# Patient Record
Sex: FEMALE | Race: OTHER | HISPANIC OR LATINO | URBAN - METROPOLITAN AREA
[De-identification: names, ages, dates, MRNs, and addresses within clinical notes are randomized per-mention and may not be internally consistent; named-entity substitution may affect disease eponyms.]

---

## 2017-03-13 ENCOUNTER — INPATIENT (INPATIENT)
Facility: HOSPITAL | Age: 72
LOS: 7 days | Discharge: HOME HEALTH | DRG: 175 | End: 2017-03-21
Attending: INTERNAL MEDICINE | Admitting: INTERNAL MEDICINE
Payer: MEDICARE

## 2017-03-13 VITALS
HEART RATE: 96 BPM | OXYGEN SATURATION: 93 % | SYSTOLIC BLOOD PRESSURE: 105 MMHG | HEIGHT: 64 IN | WEIGHT: 160.06 LBS | DIASTOLIC BLOOD PRESSURE: 73 MMHG

## 2017-03-13 DIAGNOSIS — J45.909 UNSPECIFIED ASTHMA, UNCOMPLICATED: ICD-10-CM

## 2017-03-13 DIAGNOSIS — M19.90 UNSPECIFIED OSTEOARTHRITIS, UNSPECIFIED SITE: ICD-10-CM

## 2017-03-13 DIAGNOSIS — I26.99 OTHER PULMONARY EMBOLISM WITHOUT ACUTE COR PULMONALE: ICD-10-CM

## 2017-03-13 DIAGNOSIS — I95.9 HYPOTENSION, UNSPECIFIED: ICD-10-CM

## 2017-03-13 DIAGNOSIS — Z29.9 ENCOUNTER FOR PROPHYLACTIC MEASURES, UNSPECIFIED: ICD-10-CM

## 2017-03-13 DIAGNOSIS — I26.09 OTHER PULMONARY EMBOLISM WITH ACUTE COR PULMONALE: ICD-10-CM

## 2017-03-13 LAB
ALBUMIN SERPL ELPH-MCNC: 3.4 G/DL — LOW (ref 3.5–5)
ALP SERPL-CCNC: 87 U/L — SIGNIFICANT CHANGE UP (ref 40–120)
ALT FLD-CCNC: 28 U/L DA — SIGNIFICANT CHANGE UP (ref 10–60)
ANION GAP SERPL CALC-SCNC: 13 MMOL/L — SIGNIFICANT CHANGE UP (ref 5–17)
APTT BLD: 117.4 SEC — HIGH (ref 27.5–37.4)
APTT BLD: 91.4 SEC — HIGH (ref 27.5–37.4)
AST SERPL-CCNC: 21 U/L — SIGNIFICANT CHANGE UP (ref 10–40)
BASE EXCESS BLDV CALC-SCNC: 0.8 MMOL/L — SIGNIFICANT CHANGE UP (ref -2–2)
BASOPHILS # BLD AUTO: 0.2 K/UL — SIGNIFICANT CHANGE UP (ref 0–0.2)
BASOPHILS NFR BLD AUTO: 1.5 % — SIGNIFICANT CHANGE UP (ref 0–2)
BILIRUB SERPL-MCNC: 0.5 MG/DL — SIGNIFICANT CHANGE UP (ref 0.2–1.2)
BUN SERPL-MCNC: 14 MG/DL — SIGNIFICANT CHANGE UP (ref 7–18)
CALCIUM SERPL-MCNC: 8.5 MG/DL — SIGNIFICANT CHANGE UP (ref 8.4–10.5)
CHLORIDE SERPL-SCNC: 105 MMOL/L — SIGNIFICANT CHANGE UP (ref 96–108)
CK MB BLD-MCNC: <1.9 % — SIGNIFICANT CHANGE UP (ref 0–3.5)
CK MB CFR SERPL CALC: <1 NG/ML — SIGNIFICANT CHANGE UP (ref 0–3.6)
CK SERPL-CCNC: 52 U/L — SIGNIFICANT CHANGE UP (ref 21–215)
CO2 SERPL-SCNC: 22 MMOL/L — SIGNIFICANT CHANGE UP (ref 22–31)
CREAT SERPL-MCNC: 1.02 MG/DL — SIGNIFICANT CHANGE UP (ref 0.5–1.3)
D DIMER BLD IA.RAPID-MCNC: HIGH NG/ML DDU
EOSINOPHIL # BLD AUTO: 0.5 K/UL — SIGNIFICANT CHANGE UP (ref 0–0.5)
EOSINOPHIL NFR BLD AUTO: 4.3 % — SIGNIFICANT CHANGE UP (ref 0–6)
GLUCOSE SERPL-MCNC: 166 MG/DL — HIGH (ref 70–99)
HCO3 BLDV-SCNC: 25 MMOL/L — SIGNIFICANT CHANGE UP (ref 21–29)
HCT VFR BLD CALC: 32.6 % — LOW (ref 34.5–45)
HCT VFR BLD CALC: 35.3 % — SIGNIFICANT CHANGE UP (ref 34.5–45)
HCT VFR BLD CALC: 36.8 % — SIGNIFICANT CHANGE UP (ref 34.5–45)
HGB BLD-MCNC: 11 G/DL — LOW (ref 11.5–15.5)
HGB BLD-MCNC: 11.7 G/DL — SIGNIFICANT CHANGE UP (ref 11.5–15.5)
HGB BLD-MCNC: 12.1 G/DL — SIGNIFICANT CHANGE UP (ref 11.5–15.5)
HOROWITZ INDEX BLDV+IHG-RTO: 28 — SIGNIFICANT CHANGE UP
LACTATE SERPL-SCNC: 1.4 MMOL/L — SIGNIFICANT CHANGE UP (ref 0.7–2)
LACTATE SERPL-SCNC: 3.7 MMOL/L — HIGH (ref 0.7–2)
LYMPHOCYTES # BLD AUTO: 36.9 % — SIGNIFICANT CHANGE UP (ref 13–44)
LYMPHOCYTES # BLD AUTO: 4.4 K/UL — HIGH (ref 1–3.3)
MCHC RBC-ENTMCNC: 26.6 PG — LOW (ref 27–34)
MCHC RBC-ENTMCNC: 26.8 PG — LOW (ref 27–34)
MCHC RBC-ENTMCNC: 27.6 PG — SIGNIFICANT CHANGE UP (ref 27–34)
MCHC RBC-ENTMCNC: 32.9 GM/DL — SIGNIFICANT CHANGE UP (ref 32–36)
MCHC RBC-ENTMCNC: 33.1 GM/DL — SIGNIFICANT CHANGE UP (ref 32–36)
MCHC RBC-ENTMCNC: 33.7 GM/DL — SIGNIFICANT CHANGE UP (ref 32–36)
MCV RBC AUTO: 79.7 FL — LOW (ref 80–100)
MCV RBC AUTO: 80.7 FL — SIGNIFICANT CHANGE UP (ref 80–100)
MCV RBC AUTO: 83.3 FL — SIGNIFICANT CHANGE UP (ref 80–100)
MONOCYTES # BLD AUTO: 0.9 K/UL — SIGNIFICANT CHANGE UP (ref 0–0.9)
MONOCYTES NFR BLD AUTO: 7.5 % — SIGNIFICANT CHANGE UP (ref 2–14)
NEUTROPHILS # BLD AUTO: 5.9 K/UL — SIGNIFICANT CHANGE UP (ref 1.8–7.4)
NEUTROPHILS NFR BLD AUTO: 49.8 % — SIGNIFICANT CHANGE UP (ref 43–77)
NT-PROBNP SERPL-SCNC: 44 PG/ML — SIGNIFICANT CHANGE UP (ref 0–125)
OB PNL STL: NEGATIVE — SIGNIFICANT CHANGE UP
OB PNL STL: NEGATIVE — SIGNIFICANT CHANGE UP
PCO2 BLDV: 41 MMHG — SIGNIFICANT CHANGE UP (ref 35–50)
PH BLDV: 7.4 — SIGNIFICANT CHANGE UP (ref 7.35–7.45)
PLATELET # BLD AUTO: 224 K/UL — SIGNIFICANT CHANGE UP (ref 150–400)
PLATELET # BLD AUTO: 237 K/UL — SIGNIFICANT CHANGE UP (ref 150–400)
PLATELET # BLD AUTO: 260 K/UL — SIGNIFICANT CHANGE UP (ref 150–400)
PO2 BLDV: <44 MMHG — SIGNIFICANT CHANGE UP (ref 25–45)
POTASSIUM SERPL-MCNC: 3.2 MMOL/L — LOW (ref 3.5–5.3)
POTASSIUM SERPL-SCNC: 3.2 MMOL/L — LOW (ref 3.5–5.3)
PROT SERPL-MCNC: 7.6 G/DL — SIGNIFICANT CHANGE UP (ref 6–8.3)
RBC # BLD: 4.1 M/UL — SIGNIFICANT CHANGE UP (ref 3.8–5.2)
RBC # BLD: 4.24 M/UL — SIGNIFICANT CHANGE UP (ref 3.8–5.2)
RBC # BLD: 4.56 M/UL — SIGNIFICANT CHANGE UP (ref 3.8–5.2)
RBC # FLD: 12.6 % — SIGNIFICANT CHANGE UP (ref 10.3–14.5)
RBC # FLD: 12.6 % — SIGNIFICANT CHANGE UP (ref 10.3–14.5)
RBC # FLD: 12.8 % — SIGNIFICANT CHANGE UP (ref 10.3–14.5)
SAO2 % BLDV: 35 % — LOW (ref 67–88)
SODIUM SERPL-SCNC: 140 MMOL/L — SIGNIFICANT CHANGE UP (ref 135–145)
TROPONIN I SERPL-MCNC: 0.04 NG/ML — SIGNIFICANT CHANGE UP (ref 0–0.04)
WBC # BLD: 10.7 K/UL — HIGH (ref 3.8–10.5)
WBC # BLD: 11.9 K/UL — HIGH (ref 3.8–10.5)
WBC # BLD: 15 K/UL — HIGH (ref 3.8–10.5)
WBC # FLD AUTO: 10.7 K/UL — HIGH (ref 3.8–10.5)
WBC # FLD AUTO: 11.9 K/UL — HIGH (ref 3.8–10.5)
WBC # FLD AUTO: 15 K/UL — HIGH (ref 3.8–10.5)

## 2017-03-13 PROCEDURE — 71010: CPT | Mod: 26

## 2017-03-13 PROCEDURE — 99285 EMERGENCY DEPT VISIT HI MDM: CPT

## 2017-03-13 PROCEDURE — G0452: CPT | Mod: 26

## 2017-03-13 PROCEDURE — 74174 CTA ABD&PLVS W/CONTRAST: CPT | Mod: 26

## 2017-03-13 PROCEDURE — 71275 CT ANGIOGRAPHY CHEST: CPT | Mod: 26

## 2017-03-13 PROCEDURE — 93970 EXTREMITY STUDY: CPT | Mod: 26

## 2017-03-13 RX ORDER — HEPARIN SODIUM 5000 [USP'U]/ML
INJECTION INTRAVENOUS; SUBCUTANEOUS
Qty: 25000 | Refills: 0 | Status: DISCONTINUED | OUTPATIENT
Start: 2017-03-13 | End: 2017-03-14

## 2017-03-13 RX ORDER — PANTOPRAZOLE SODIUM 20 MG/1
40 TABLET, DELAYED RELEASE ORAL
Qty: 0 | Refills: 0 | Status: DISCONTINUED | OUTPATIENT
Start: 2017-03-13 | End: 2017-03-21

## 2017-03-13 RX ORDER — HEPARIN SODIUM 5000 [USP'U]/ML
3000 INJECTION INTRAVENOUS; SUBCUTANEOUS EVERY 6 HOURS
Qty: 0 | Refills: 0 | Status: DISCONTINUED | OUTPATIENT
Start: 2017-03-13 | End: 2017-03-13

## 2017-03-13 RX ORDER — HEPARIN SODIUM 5000 [USP'U]/ML
6000 INJECTION INTRAVENOUS; SUBCUTANEOUS EVERY 6 HOURS
Qty: 0 | Refills: 0 | Status: DISCONTINUED | OUTPATIENT
Start: 2017-03-13 | End: 2017-03-13

## 2017-03-13 RX ORDER — SODIUM CHLORIDE 9 MG/ML
1000 INJECTION INTRAMUSCULAR; INTRAVENOUS; SUBCUTANEOUS
Qty: 0 | Refills: 0 | Status: DISCONTINUED | OUTPATIENT
Start: 2017-03-13 | End: 2017-03-14

## 2017-03-13 RX ORDER — ONDANSETRON 8 MG/1
4 TABLET, FILM COATED ORAL EVERY 8 HOURS
Qty: 0 | Refills: 0 | Status: DISCONTINUED | OUTPATIENT
Start: 2017-03-13 | End: 2017-03-21

## 2017-03-13 RX ORDER — SODIUM CHLORIDE 9 MG/ML
2000 INJECTION INTRAMUSCULAR; INTRAVENOUS; SUBCUTANEOUS ONCE
Qty: 0 | Refills: 0 | Status: COMPLETED | OUTPATIENT
Start: 2017-03-13 | End: 2017-03-13

## 2017-03-13 RX ORDER — HEPARIN SODIUM 5000 [USP'U]/ML
6000 INJECTION INTRAVENOUS; SUBCUTANEOUS ONCE
Qty: 0 | Refills: 0 | Status: COMPLETED | OUTPATIENT
Start: 2017-03-13 | End: 2017-03-13

## 2017-03-13 RX ADMIN — SODIUM CHLORIDE 100 MILLILITER(S): 9 INJECTION INTRAMUSCULAR; INTRAVENOUS; SUBCUTANEOUS at 12:34

## 2017-03-13 RX ADMIN — ONDANSETRON 4 MILLIGRAM(S): 8 TABLET, FILM COATED ORAL at 12:34

## 2017-03-13 RX ADMIN — HEPARIN SODIUM 6000 UNIT(S): 5000 INJECTION INTRAVENOUS; SUBCUTANEOUS at 10:59

## 2017-03-13 RX ADMIN — HEPARIN SODIUM 1300 UNIT(S)/HR: 5000 INJECTION INTRAVENOUS; SUBCUTANEOUS at 17:00

## 2017-03-13 RX ADMIN — HEPARIN SODIUM 1300 UNIT(S)/HR: 5000 INJECTION INTRAVENOUS; SUBCUTANEOUS at 10:54

## 2017-03-13 RX ADMIN — SODIUM CHLORIDE 4000 MILLILITER(S): 9 INJECTION INTRAMUSCULAR; INTRAVENOUS; SUBCUTANEOUS at 08:38

## 2017-03-13 NOTE — ED ADULT NURSE NOTE - OBJECTIVE STATEMENT
Patient received as a notification for unresponsiveness. Patient verbally responsive noted lethargic as per patient she walked a flight of stairs and felt dizzy, patient complaining of LLL pain, walked to [facility] and fell on the lobby. Patient was seen and evaluated by MD Hansen, pending tests no distress on cardiac monitor

## 2017-03-13 NOTE — ED PROVIDER NOTE - ENMT, MLM
Airway patent. Mucous membranes are dry. Throat has no vesicles, no oropharyngeal exudates and uvula is midline.

## 2017-03-13 NOTE — ED PROVIDER NOTE - CRITICAL CARE PROVIDED
documentation/additional history taking/consultation with other physicians/direct patient care (not related to procedure)/consult w/ pt's family directly relating to pts condition/interpretation of diagnostic studies

## 2017-03-13 NOTE — H&P ADULT. - PROBLEM SELECTOR PLAN 1
Secondary to obstructive shock 2/2 large clot burden.   -VS: Tmax 98.7, BP 97/57, HR 87, RR 18 and 96% on 2L NC.   -Orthostatics negative: lying /83, HR 88 and sitting /79, HR 85  -EKG shows sinus rhythm at 94bpm, no ST/T changes and CE x 1 negative  -CXR no infiltrate or effusion.   -D-dimer 23K and CTA chest showed bilateral large PE.  -In the ED: s/p NS 2L bolus without much improvement   -Admit to ICU   -Cardiology Dr. Pittman  -c/w Heparin gtt for now  -Stat Echo being obtained to r/o RV strain and role for tPA  -c/w NPO, IVF hydration and Zofran for prn nausea   -f/u repeat lactate   -f/u Venous US LE (re: r/o DVT)  -f/u hypercoagulation and autoimmune panel Secondary to obstructive shock 2/2 large clot burden.   -VS: Tmax 98.7, BP 97/57, HR 87, RR 18 and 96% on 2L NC.   -Orthostatics negative: lying /83, HR 88 and sitting /79, HR 85  -EKG shows sinus rhythm at 94bpm, no ST/T changes and CE x 1 negative  -CXR no infiltrate or effusion.   -CTA chest: extensive bilateral pulmonary emboli, bilaterally extending from the main pulmonary arteries to segmental and subsegmental pulmonary arteries in the upper and lower lobes, right larger than left. The main pulmonary trunk is enlarged, measuring 3.6 cm in diameter, suggestive of pulmonary arterial hypertension. There is evidence of right heart strain.  -In the ED: s/p NS 2L bolus without much improvement   -Admit to ICU   -Cardiology Dr. Pittman  -c/w Heparin gtt for now  -STAT Echo is being obtained to r/o RV strain and role for tPA  -c/w NPO, IVF hydration and Zofran for prn nausea   -f/u repeat lactate   -f/u Venous US LE (re: r/o DVT)  -f/u hypercoagulation and autoimmune panel

## 2017-03-13 NOTE — H&P ADULT. - HISTORY OF PRESENT ILLNESS
71 years old female from home with PMH of HTN (on Lisinopril for years), OA, Asthma (with nasal polyp, on Singulair) and multiple spontaneous abortions (no kids, unable to conceive) presented to the ED with dizziness and left leg pain. Patient was in her usual state health until yesterday when she noticed severe left leg pain without any trauma. She took Tylenol for pain with minimal relief. This morning 71 years old female from home with PMH of HTN (on Lisinopril for years), OA, Asthma (with nasal polyp, on Singulair) and multiple spontaneous abortions (no kids, unable to conceive) presented to the ED with dizziness and left leg pain. Patient was in her usual state health until yesterday when she noticed severe left leg pain without any trauma. She took Tylenol for pain with minimal relief. This morning while she was getting ready for work, she started to have dizziness with not much room spinning sensation. She reports feeling weak and when she came to [facility], she had near syncopal episode with ?frothing at the mouth. Patient reports no fall and no head trauma. No LOC, tongue bite, fecal or urinary incontinence. Pertinent history; she was recently treated with Medrol pack (4mg with 6 days taper starting 24mg day 1) for Asthma and allergy. She completed the taper 3-4 months ago. She recently flu to [employer] (5 hour flight) last month, in February. She has history of joint pain, miscarriages and spontaneous abortions; still unable to conceive. Denies malar rash or oral ulcers. Family 71 years old female from home with PMH of HTN (on Lisinopril for years), OA, Asthma (with nasal polyp, on Singulair) and multiple spontaneous abortions (no kids, unable to conceive) presented to the ED with dizziness and left leg pain. Patient was in her usual state health until yesterday when she noticed severe left leg pain without any trauma. She took Tylenol for pain with minimal relief. This morning while she was getting ready for work, she started to have dizziness with not much room spinning sensation. She reports feeling weak and when she came to [facility], she had near syncopal episode with ?frothing at the mouth. Patient reports no fall and no head trauma. No LOC, tongue bite, fecal or urinary incontinence. Pertinent history; she was recently treated with Medrol pack (4mg with 6 days taper starting 24mg day 1) for Asthma and allergy. She completed the taper 3-4 months ago. She recently flu to [employer] (5 hour flight) last month, in February. She has history of joint pain, miscarriages and spontaneous abortions; still unable to conceive. Denies malar rash or oral ulcers. Family history of sister who had ?clot in the arm and brother with MI (age 58). Patient denies any chest pain, palpitations, shortness of breath but does report diaphoresis. Reports nausea but denies fever, chills or abdominal pain. Denies smoking or alcohol use.     In the ED, VS: Tmax 98.7, BP 97/57, HR 87, RR 18 and 96% on 2L NC. EKG shows sinus rhythm at 94bpm, no ST/T changes. CXR no infiltrate or effusion. CTA chest showed bilateral large PE. Patient was given NS 2L bolus without much improvement in the BP therefore ICU was consulted.

## 2017-03-13 NOTE — H&P ADULT. - ATTENDING COMMENTS
Assessment and Plan:   Assessment:  · Assessment	  71 years old female from home with PMH of HTN (on Lisinopril for years), OA, Asthma (with nasal polyp, on Singulair) and multiple spontaneous abortions (no kids, unable to conceive) presented to the ED with dizziness and left leg pain. Patient is being admitted to ICU for hypotension in the setting of large bilateral PE.       Problem/Plan - 1:  ·  Problem: Hypotension.  Plan: Secondary to obstructive shock 2/2 large clot burden.   -VS: Tmax 98.7, BP 97/57, HR 87, RR 18 and 96% on 2L NC.   -Orthostatics negative: lying /83, HR 88 and sitting /79, HR 85  -EKG shows sinus rhythm at 94bpm, no ST/T changes and CE x 1 negative  -CXR no infiltrate or effusion.   -CTA chest: extensive bilateral pulmonary emboli, bilaterally extending from the main pulmonary arteries to segmental and subsegmental pulmonary arteries in the upper and lower lobes, right larger than left. The main pulmonary trunk is enlarged, measuring 3.6 cm in diameter, suggestive of pulmonary arterial hypertension. There is evidence of right heart strain. 2DECHO did not show RA or RV failure and pat has negative Troponin , therefore treated with heparin drip since nitals are relatively stable  -In the ED: s/p NS 2L bolus without much improvement   -Admit to ICU   -Cardiology Dr. Pittman  -c/w Heparin gtt for now  -STAT Echo is being obtained to r/o RV strain and role for tPA  -c/w NPO, IVF hydration and Zofran for prn nausea   -f/u repeat lactate   -f/u Venous US LE (re: r/o DVT)  -f/u hypercoagulation and autoimmune panel.     Problem/Plan - 2:  ·  Problem: Pulmonary embolism, bilateral.  Plan: Likely 2/2 DVT (unprovoked) 2/2 hypercoagulable state family history of clots ( ?Factor V Leiden) vs APL (h/o multiple spontaneous abortions/miscarriages).  -Management as mentioned above  -Work-up as mentioned above.     Problem/Plan - 3:  ·  Problem: Asthma.  Plan: Not in exacerbation, with nasal polyp. C/w NC for now.     Problem/Plan - 4:  ·  Problem: Arthritis.  Plan: Likely 2/2 autoimmune arthritis. C/w current management.     Problem/Plan - 5:  ·  Problem: Prophylactic measure.  Plan: GI on Protonix and DVT patient has PE and on Heparin gtt.

## 2017-03-13 NOTE — H&P ADULT. - ASSESSMENT
71 years old female from home with PMH of HTN (on Lisinopril for years), OA, Asthma (with nasal polyp, on Singulair) and multiple spontaneous abortions (no kids, unable to conceive) presented to the ED with dizziness and left leg pain. Patient is being admitted to ICU for hypotension in the setting of large bilateral PE.

## 2017-03-13 NOTE — ED PROVIDER NOTE - MEDICAL DECISION MAKING DETAILS
70 y/o F pt BIB EMS c/o lightheadedness s/p syncopal episode today (this morning). Blood work, fluid resuscitation, X-Ray, CTA, and ICU consult.

## 2017-03-13 NOTE — ED PROVIDER NOTE - NS ED MD SCRIBE ATTENDING SCRIBE SECTIONS
PAST MEDICAL/SURGICAL/SOCIAL HISTORY/DISPOSITION/HISTORY OF PRESENT ILLNESS/RESULTS/PHYSICAL EXAM/REVIEW OF SYSTEMS/VITAL SIGNS( Pullset)

## 2017-03-13 NOTE — ED PROVIDER NOTE - OBJECTIVE STATEMENT
72 y/o F pt with PMHx of HTN (currently on Enalapril 20mg) and Seasonal Allergies and no PSHx BIB EMS to ED c/o lightheadedness s/p syncopal episode today (this morning). Pt states she felt dizziness after walking up one flight of stairs at work; pt then walked to [facility], where she fainted in the lobby. Pt also states pain in L leg. Pt denies HA, cough, CP, fever, chills, nausea (feels nauseous now in ED), vomiting, black stool, blood in stool, or any other complaints. Pt also denies Hx of cardiac problems, Hx of smoking, or Hx of EtOH abuse. Pt did not take Enalapril today; pt took Tylenol this morning for L leg pain. FS in ED: 150. NKDA.

## 2017-03-13 NOTE — ED PROVIDER NOTE - CHPI ED SYMPTOMS NEG
no fever/no nausea/no vomiting/no chills, no HA, no cough, no chest pain, no black stool, no blood in stool

## 2017-03-13 NOTE — ED PROVIDER NOTE - CARE PLAN
Principal Discharge DX:	Hypotension Principal Discharge DX:	Other acute pulmonary embolism with acute cor pulmonale

## 2017-03-13 NOTE — H&P ADULT. - PROBLEM SELECTOR PLAN 2
Likely 2/2 DVT (unprovoked) 2/2 hypercoagulable state family history of clots ( ?Factor V Leiden) vs APL (h/o multiple spontaneous abortions/miscarriages).  -Management as mentioned above  -Work-up as mentioned above

## 2017-03-14 LAB
ANA PAT FLD IF-IMP: ABNORMAL
ANA TITR SER: ABNORMAL
ANION GAP SERPL CALC-SCNC: 11 MMOL/L — SIGNIFICANT CHANGE UP (ref 5–17)
ANTI-RIBONUCLEAR PROTEIN: 0.2 AI — SIGNIFICANT CHANGE UP
APCR PPP: 2.41 RATIO — SIGNIFICANT CHANGE UP
APTT BLD: 85.6 SEC — HIGH (ref 27.5–37.4)
APTT BLD: 90.1 SEC — HIGH (ref 27.5–37.4)
AT III ACT/NOR PPP CHRO: 78 % — LOW (ref 85–135)
B2 GLYCOPROT1 AB SER QL: POSITIVE
BASOPHILS # BLD AUTO: 0.1 K/UL — SIGNIFICANT CHANGE UP (ref 0–0.2)
BASOPHILS NFR BLD AUTO: 0.3 % — SIGNIFICANT CHANGE UP (ref 0–2)
BUN SERPL-MCNC: 8 MG/DL — SIGNIFICANT CHANGE UP (ref 7–18)
C3 SERPL-MCNC: 110 MG/DL — SIGNIFICANT CHANGE UP (ref 80–180)
C4 SERPL-MCNC: 13 MG/DL — SIGNIFICANT CHANGE UP (ref 10–45)
CALCIUM SERPL-MCNC: 8.2 MG/DL — LOW (ref 8.4–10.5)
CARDIOLIPIN AB SER-ACNC: NEGATIVE — SIGNIFICANT CHANGE UP
CHLORIDE SERPL-SCNC: 112 MMOL/L — HIGH (ref 96–108)
CK MB BLD-MCNC: 4 % — HIGH (ref 0–3.5)
CK MB CFR SERPL CALC: 3.4 NG/ML — SIGNIFICANT CHANGE UP (ref 0–3.6)
CK SERPL-CCNC: 84 U/L — SIGNIFICANT CHANGE UP (ref 21–215)
CO2 SERPL-SCNC: 20 MMOL/L — LOW (ref 22–31)
CREAT SERPL-MCNC: 0.65 MG/DL — SIGNIFICANT CHANGE UP (ref 0.5–1.3)
DRVVT SCREEN TO CONFIRM RATIO: SIGNIFICANT CHANGE UP
DSDNA AB SER-ACNC: <12 IU/ML — SIGNIFICANT CHANGE UP
ENA SM AB FLD QL: <0.2 AI — SIGNIFICANT CHANGE UP
EOSINOPHIL # BLD AUTO: 0 K/UL — SIGNIFICANT CHANGE UP (ref 0–0.5)
EOSINOPHIL NFR BLD AUTO: 0.1 % — SIGNIFICANT CHANGE UP (ref 0–6)
FACT V ACT/NOR PPP: 67 % — SIGNIFICANT CHANGE UP (ref 50–150)
GLUCOSE SERPL-MCNC: 122 MG/DL — HIGH (ref 70–99)
HCT VFR BLD CALC: 33 % — LOW (ref 34.5–45)
HCYS SERPL-MCNC: 9 UMOL/L — SIGNIFICANT CHANGE UP (ref 5–20)
HGB BLD-MCNC: 10.9 G/DL — LOW (ref 11.5–15.5)
LA NT DPL PPP QL: 39.7 SEC — SIGNIFICANT CHANGE UP
LYMPHOCYTES # BLD AUTO: 1.7 K/UL — SIGNIFICANT CHANGE UP (ref 1–3.3)
LYMPHOCYTES # BLD AUTO: 9.7 % — LOW (ref 13–44)
MAGNESIUM SERPL-MCNC: 1.8 MG/DL — SIGNIFICANT CHANGE UP (ref 1.8–2.4)
MCHC RBC-ENTMCNC: 26.5 PG — LOW (ref 27–34)
MCHC RBC-ENTMCNC: 33 GM/DL — SIGNIFICANT CHANGE UP (ref 32–36)
MCV RBC AUTO: 80.4 FL — SIGNIFICANT CHANGE UP (ref 80–100)
MONOCYTES # BLD AUTO: 1 K/UL — HIGH (ref 0–0.9)
MONOCYTES NFR BLD AUTO: 5.9 % — SIGNIFICANT CHANGE UP (ref 2–14)
NEUTROPHILS # BLD AUTO: 14.6 K/UL — HIGH (ref 1.8–7.4)
NEUTROPHILS NFR BLD AUTO: 84 % — HIGH (ref 43–77)
PHOSPHATE SERPL-MCNC: 2.7 MG/DL — SIGNIFICANT CHANGE UP (ref 2.5–4.5)
PLATELET # BLD AUTO: 233 K/UL — SIGNIFICANT CHANGE UP (ref 150–400)
POTASSIUM SERPL-MCNC: 3.5 MMOL/L — SIGNIFICANT CHANGE UP (ref 3.5–5.3)
POTASSIUM SERPL-SCNC: 3.5 MMOL/L — SIGNIFICANT CHANGE UP (ref 3.5–5.3)
PROT C ACT/NOR PPP: 81 % — SIGNIFICANT CHANGE UP (ref 74–150)
PROT S FREE AG PPP IA-ACNC: 55 % — LOW (ref 61–131)
RBC # BLD: 4.1 M/UL — SIGNIFICANT CHANGE UP (ref 3.8–5.2)
RBC # FLD: 12.6 % — SIGNIFICANT CHANGE UP (ref 10.3–14.5)
SODIUM SERPL-SCNC: 143 MMOL/L — SIGNIFICANT CHANGE UP (ref 135–145)
TROPONIN I SERPL-MCNC: 1.54 NG/ML — HIGH (ref 0–0.04)
WBC # BLD: 17.3 K/UL — HIGH (ref 3.8–10.5)
WBC # FLD AUTO: 17.3 K/UL — HIGH (ref 3.8–10.5)

## 2017-03-14 PROCEDURE — 71010: CPT | Mod: 26

## 2017-03-14 RX ORDER — TIOTROPIUM BROMIDE 18 UG/1
1 CAPSULE ORAL; RESPIRATORY (INHALATION) DAILY
Qty: 0 | Refills: 0 | Status: DISCONTINUED | OUTPATIENT
Start: 2017-03-14 | End: 2017-03-21

## 2017-03-14 RX ORDER — HEPARIN SODIUM 5000 [USP'U]/ML
1100 INJECTION INTRAVENOUS; SUBCUTANEOUS
Qty: 25000 | Refills: 0 | Status: DISCONTINUED | OUTPATIENT
Start: 2017-03-14 | End: 2017-03-14

## 2017-03-14 RX ORDER — HEPARIN SODIUM 5000 [USP'U]/ML
3000 INJECTION INTRAVENOUS; SUBCUTANEOUS EVERY 6 HOURS
Qty: 0 | Refills: 0 | Status: DISCONTINUED | OUTPATIENT
Start: 2017-03-14 | End: 2017-03-14

## 2017-03-14 RX ORDER — IPRATROPIUM/ALBUTEROL SULFATE 18-103MCG
3 AEROSOL WITH ADAPTER (GRAM) INHALATION EVERY 6 HOURS
Qty: 0 | Refills: 0 | Status: DISCONTINUED | OUTPATIENT
Start: 2017-03-14 | End: 2017-03-17

## 2017-03-14 RX ORDER — ALBUTEROL 90 UG/1
1 AEROSOL, METERED ORAL EVERY 4 HOURS
Qty: 0 | Refills: 0 | Status: DISCONTINUED | OUTPATIENT
Start: 2017-03-14 | End: 2017-03-21

## 2017-03-14 RX ORDER — HEPARIN SODIUM 5000 [USP'U]/ML
6000 INJECTION INTRAVENOUS; SUBCUTANEOUS EVERY 6 HOURS
Qty: 0 | Refills: 0 | Status: DISCONTINUED | OUTPATIENT
Start: 2017-03-14 | End: 2017-03-14

## 2017-03-14 RX ORDER — MONTELUKAST 4 MG/1
10 TABLET, CHEWABLE ORAL AT BEDTIME
Qty: 0 | Refills: 0 | Status: DISCONTINUED | OUTPATIENT
Start: 2017-03-14 | End: 2017-03-21

## 2017-03-14 RX ORDER — ENOXAPARIN SODIUM 100 MG/ML
80 INJECTION SUBCUTANEOUS EVERY 12 HOURS
Qty: 0 | Refills: 0 | Status: DISCONTINUED | OUTPATIENT
Start: 2017-03-14 | End: 2017-03-21

## 2017-03-14 RX ADMIN — HEPARIN SODIUM 1100 UNIT(S)/HR: 5000 INJECTION INTRAVENOUS; SUBCUTANEOUS at 00:14

## 2017-03-14 RX ADMIN — HEPARIN SODIUM 1100 UNIT(S)/HR: 5000 INJECTION INTRAVENOUS; SUBCUTANEOUS at 07:05

## 2017-03-14 RX ADMIN — Medication 3 MILLILITER(S): at 21:05

## 2017-03-14 RX ADMIN — MONTELUKAST 10 MILLIGRAM(S): 4 TABLET, CHEWABLE ORAL at 22:18

## 2017-03-14 RX ADMIN — SODIUM CHLORIDE 100 MILLILITER(S): 9 INJECTION INTRAMUSCULAR; INTRAVENOUS; SUBCUTANEOUS at 07:11

## 2017-03-14 RX ADMIN — Medication 3 MILLILITER(S): at 14:28

## 2017-03-14 RX ADMIN — Medication 3 MILLILITER(S): at 08:47

## 2017-03-14 RX ADMIN — PANTOPRAZOLE SODIUM 40 MILLIGRAM(S): 20 TABLET, DELAYED RELEASE ORAL at 06:16

## 2017-03-14 RX ADMIN — HEPARIN SODIUM 1100 UNIT(S)/HR: 5000 INJECTION INTRAVENOUS; SUBCUTANEOUS at 13:48

## 2017-03-14 RX ADMIN — ENOXAPARIN SODIUM 80 MILLIGRAM(S): 100 INJECTION SUBCUTANEOUS at 18:08

## 2017-03-15 LAB
ANION GAP SERPL CALC-SCNC: 12 MMOL/L — SIGNIFICANT CHANGE UP (ref 5–17)
BASOPHILS # BLD AUTO: 0.1 K/UL — SIGNIFICANT CHANGE UP (ref 0–0.2)
BASOPHILS NFR BLD AUTO: 1 % — SIGNIFICANT CHANGE UP (ref 0–2)
BUN SERPL-MCNC: 8 MG/DL — SIGNIFICANT CHANGE UP (ref 7–18)
CALCIUM SERPL-MCNC: 8.1 MG/DL — LOW (ref 8.4–10.5)
CHLORIDE SERPL-SCNC: 111 MMOL/L — HIGH (ref 96–108)
CO2 SERPL-SCNC: 22 MMOL/L — SIGNIFICANT CHANGE UP (ref 22–31)
CREAT SERPL-MCNC: 0.64 MG/DL — SIGNIFICANT CHANGE UP (ref 0.5–1.3)
EOSINOPHIL # BLD AUTO: 0.1 K/UL — SIGNIFICANT CHANGE UP (ref 0–0.5)
EOSINOPHIL NFR BLD AUTO: 0.8 % — SIGNIFICANT CHANGE UP (ref 0–6)
GLUCOSE SERPL-MCNC: 100 MG/DL — HIGH (ref 70–99)
HCT VFR BLD CALC: 30.7 % — LOW (ref 34.5–45)
HGB BLD-MCNC: 10 G/DL — LOW (ref 11.5–15.5)
LYMPHOCYTES # BLD AUTO: 2.4 K/UL — SIGNIFICANT CHANGE UP (ref 1–3.3)
LYMPHOCYTES # BLD AUTO: 20.4 % — SIGNIFICANT CHANGE UP (ref 13–44)
MAGNESIUM SERPL-MCNC: 2 MG/DL — SIGNIFICANT CHANGE UP (ref 1.8–2.4)
MCHC RBC-ENTMCNC: 27.2 PG — SIGNIFICANT CHANGE UP (ref 27–34)
MCHC RBC-ENTMCNC: 32.5 GM/DL — SIGNIFICANT CHANGE UP (ref 32–36)
MCV RBC AUTO: 83.8 FL — SIGNIFICANT CHANGE UP (ref 80–100)
MONOCYTES # BLD AUTO: 0.8 K/UL — SIGNIFICANT CHANGE UP (ref 0–0.9)
MONOCYTES NFR BLD AUTO: 7.1 % — SIGNIFICANT CHANGE UP (ref 2–14)
NEUTROPHILS # BLD AUTO: 8.2 K/UL — HIGH (ref 1.8–7.4)
NEUTROPHILS NFR BLD AUTO: 70.8 % — SIGNIFICANT CHANGE UP (ref 43–77)
PHOSPHATE SERPL-MCNC: 2.2 MG/DL — LOW (ref 2.5–4.5)
PLATELET # BLD AUTO: 230 K/UL — SIGNIFICANT CHANGE UP (ref 150–400)
POTASSIUM SERPL-MCNC: 3.6 MMOL/L — SIGNIFICANT CHANGE UP (ref 3.5–5.3)
POTASSIUM SERPL-SCNC: 3.6 MMOL/L — SIGNIFICANT CHANGE UP (ref 3.5–5.3)
RBC # BLD: 3.67 M/UL — LOW (ref 3.8–5.2)
RBC # FLD: 12.7 % — SIGNIFICANT CHANGE UP (ref 10.3–14.5)
SODIUM SERPL-SCNC: 145 MMOL/L — SIGNIFICANT CHANGE UP (ref 135–145)
WBC # BLD: 11.6 K/UL — HIGH (ref 3.8–10.5)
WBC # FLD AUTO: 11.6 K/UL — HIGH (ref 3.8–10.5)

## 2017-03-15 PROCEDURE — 71010: CPT | Mod: 26

## 2017-03-15 RX ORDER — POTASSIUM PHOSPHATE, MONOBASIC POTASSIUM PHOSPHATE, DIBASIC 236; 224 MG/ML; MG/ML
15 INJECTION, SOLUTION INTRAVENOUS ONCE
Qty: 0 | Refills: 0 | Status: COMPLETED | OUTPATIENT
Start: 2017-03-15 | End: 2017-03-15

## 2017-03-15 RX ADMIN — PANTOPRAZOLE SODIUM 40 MILLIGRAM(S): 20 TABLET, DELAYED RELEASE ORAL at 06:51

## 2017-03-15 RX ADMIN — Medication 3 MILLILITER(S): at 09:29

## 2017-03-15 RX ADMIN — ENOXAPARIN SODIUM 80 MILLIGRAM(S): 100 INJECTION SUBCUTANEOUS at 17:33

## 2017-03-15 RX ADMIN — POTASSIUM PHOSPHATE, MONOBASIC POTASSIUM PHOSPHATE, DIBASIC 62.5 MILLIMOLE(S): 236; 224 INJECTION, SOLUTION INTRAVENOUS at 09:29

## 2017-03-15 RX ADMIN — Medication 3 MILLILITER(S): at 15:35

## 2017-03-15 RX ADMIN — MONTELUKAST 10 MILLIGRAM(S): 4 TABLET, CHEWABLE ORAL at 21:51

## 2017-03-15 RX ADMIN — ENOXAPARIN SODIUM 80 MILLIGRAM(S): 100 INJECTION SUBCUTANEOUS at 05:08

## 2017-03-16 LAB
ANION GAP SERPL CALC-SCNC: 9 MMOL/L — SIGNIFICANT CHANGE UP (ref 5–17)
BASOPHILS # BLD AUTO: 0.1 K/UL — SIGNIFICANT CHANGE UP (ref 0–0.2)
BASOPHILS NFR BLD AUTO: 1.2 % — SIGNIFICANT CHANGE UP (ref 0–2)
BUN SERPL-MCNC: 9 MG/DL — SIGNIFICANT CHANGE UP (ref 7–18)
CALCIUM SERPL-MCNC: 8.3 MG/DL — LOW (ref 8.4–10.5)
CHLORIDE SERPL-SCNC: 109 MMOL/L — HIGH (ref 96–108)
CO2 SERPL-SCNC: 24 MMOL/L — SIGNIFICANT CHANGE UP (ref 22–31)
CREAT SERPL-MCNC: 0.66 MG/DL — SIGNIFICANT CHANGE UP (ref 0.5–1.3)
DNA PLOIDY SPEC FC-IMP: SIGNIFICANT CHANGE UP
EOSINOPHIL # BLD AUTO: 0.4 K/UL — SIGNIFICANT CHANGE UP (ref 0–0.5)
EOSINOPHIL NFR BLD AUTO: 4 % — SIGNIFICANT CHANGE UP (ref 0–6)
GLUCOSE SERPL-MCNC: 94 MG/DL — SIGNIFICANT CHANGE UP (ref 70–99)
HCT VFR BLD CALC: 30.6 % — LOW (ref 34.5–45)
HGB BLD-MCNC: 10.1 G/DL — LOW (ref 11.5–15.5)
LYMPHOCYTES # BLD AUTO: 2.3 K/UL — SIGNIFICANT CHANGE UP (ref 1–3.3)
LYMPHOCYTES # BLD AUTO: 22.9 % — SIGNIFICANT CHANGE UP (ref 13–44)
MAGNESIUM SERPL-MCNC: 2.1 MG/DL — SIGNIFICANT CHANGE UP (ref 1.8–2.4)
MCHC RBC-ENTMCNC: 26.5 PG — LOW (ref 27–34)
MCHC RBC-ENTMCNC: 33.1 GM/DL — SIGNIFICANT CHANGE UP (ref 32–36)
MCV RBC AUTO: 80 FL — SIGNIFICANT CHANGE UP (ref 80–100)
MONOCYTES # BLD AUTO: 0.7 K/UL — SIGNIFICANT CHANGE UP (ref 0–0.9)
MONOCYTES NFR BLD AUTO: 6.8 % — SIGNIFICANT CHANGE UP (ref 2–14)
MTHFR GENE INTERPRETATION: SIGNIFICANT CHANGE UP
NEUTROPHILS # BLD AUTO: 6.5 K/UL — SIGNIFICANT CHANGE UP (ref 1.8–7.4)
NEUTROPHILS NFR BLD AUTO: 65.1 % — SIGNIFICANT CHANGE UP (ref 43–77)
PHOSPHATE SERPL-MCNC: 3.3 MG/DL — SIGNIFICANT CHANGE UP (ref 2.5–4.5)
PLATELET # BLD AUTO: 270 K/UL — SIGNIFICANT CHANGE UP (ref 150–400)
POTASSIUM SERPL-MCNC: 3.5 MMOL/L — SIGNIFICANT CHANGE UP (ref 3.5–5.3)
POTASSIUM SERPL-SCNC: 3.5 MMOL/L — SIGNIFICANT CHANGE UP (ref 3.5–5.3)
PTR INTERPRETATION: SIGNIFICANT CHANGE UP
RBC # BLD: 3.83 M/UL — SIGNIFICANT CHANGE UP (ref 3.8–5.2)
RBC # FLD: 12.4 % — SIGNIFICANT CHANGE UP (ref 10.3–14.5)
SODIUM SERPL-SCNC: 142 MMOL/L — SIGNIFICANT CHANGE UP (ref 135–145)
WBC # BLD: 10 K/UL — SIGNIFICANT CHANGE UP (ref 3.8–10.5)
WBC # FLD AUTO: 10 K/UL — SIGNIFICANT CHANGE UP (ref 3.8–10.5)

## 2017-03-16 RX ORDER — WARFARIN SODIUM 2.5 MG/1
5 TABLET ORAL DAILY
Qty: 0 | Refills: 0 | Status: DISCONTINUED | OUTPATIENT
Start: 2017-03-16 | End: 2017-03-17

## 2017-03-16 RX ADMIN — MONTELUKAST 10 MILLIGRAM(S): 4 TABLET, CHEWABLE ORAL at 21:32

## 2017-03-16 RX ADMIN — Medication 10 MILLIGRAM(S): at 17:33

## 2017-03-16 RX ADMIN — WARFARIN SODIUM 5 MILLIGRAM(S): 2.5 TABLET ORAL at 21:32

## 2017-03-16 RX ADMIN — PANTOPRAZOLE SODIUM 40 MILLIGRAM(S): 20 TABLET, DELAYED RELEASE ORAL at 05:14

## 2017-03-16 RX ADMIN — Medication 3 MILLILITER(S): at 09:07

## 2017-03-16 RX ADMIN — ENOXAPARIN SODIUM 80 MILLIGRAM(S): 100 INJECTION SUBCUTANEOUS at 05:13

## 2017-03-16 RX ADMIN — Medication 3 MILLILITER(S): at 20:30

## 2017-03-16 RX ADMIN — ENOXAPARIN SODIUM 80 MILLIGRAM(S): 100 INJECTION SUBCUTANEOUS at 17:33

## 2017-03-16 RX ADMIN — Medication 3 MILLILITER(S): at 14:40

## 2017-03-17 LAB
APTT BLD: 36.8 SEC — SIGNIFICANT CHANGE UP (ref 27.5–37.4)
DRVVT SCREEN TO CONFIRM RATIO: SIGNIFICANT CHANGE UP
HCT VFR BLD CALC: 31.2 % — LOW (ref 34.5–45)
HGB BLD-MCNC: 10.2 G/DL — LOW (ref 11.5–15.5)
INR BLD: 1.18 RATIO — HIGH (ref 0.88–1.16)
LA NT DPL PPP QL: 35.4 SEC — SIGNIFICANT CHANGE UP
MCHC RBC-ENTMCNC: 26.7 PG — LOW (ref 27–34)
MCHC RBC-ENTMCNC: 32.8 GM/DL — SIGNIFICANT CHANGE UP (ref 32–36)
MCV RBC AUTO: 81.6 FL — SIGNIFICANT CHANGE UP (ref 80–100)
NORMALIZED SCT PPP-RTO: 1 RATIO — SIGNIFICANT CHANGE UP (ref 0–1.16)
NORMALIZED SCT PPP-RTO: SIGNIFICANT CHANGE UP
PLATELET # BLD AUTO: 330 K/UL — SIGNIFICANT CHANGE UP (ref 150–400)
PROTHROM AB SERPL-ACNC: 13.2 SEC — HIGH (ref 10–13.1)
RBC # BLD: 3.82 M/UL — SIGNIFICANT CHANGE UP (ref 3.8–5.2)
RBC # FLD: 12.4 % — SIGNIFICANT CHANGE UP (ref 10.3–14.5)
WBC # BLD: 7.2 K/UL — SIGNIFICANT CHANGE UP (ref 3.8–10.5)
WBC # FLD AUTO: 7.2 K/UL — SIGNIFICANT CHANGE UP (ref 3.8–10.5)

## 2017-03-17 RX ORDER — WARFARIN SODIUM 2.5 MG/1
5 TABLET ORAL DAILY
Qty: 0 | Refills: 0 | Status: COMPLETED | OUTPATIENT
Start: 2017-03-17 | End: 2017-03-19

## 2017-03-17 RX ORDER — ALBUTEROL 90 UG/1
2 AEROSOL, METERED ORAL EVERY 6 HOURS
Qty: 0 | Refills: 0 | Status: DISCONTINUED | OUTPATIENT
Start: 2017-03-17 | End: 2017-03-21

## 2017-03-17 RX ADMIN — ALBUTEROL 2 PUFF(S): 90 AEROSOL, METERED ORAL at 22:38

## 2017-03-17 RX ADMIN — PANTOPRAZOLE SODIUM 40 MILLIGRAM(S): 20 TABLET, DELAYED RELEASE ORAL at 05:56

## 2017-03-17 RX ADMIN — Medication 10 MILLIGRAM(S): at 05:56

## 2017-03-17 RX ADMIN — ENOXAPARIN SODIUM 80 MILLIGRAM(S): 100 INJECTION SUBCUTANEOUS at 05:56

## 2017-03-17 RX ADMIN — ENOXAPARIN SODIUM 80 MILLIGRAM(S): 100 INJECTION SUBCUTANEOUS at 17:10

## 2017-03-17 RX ADMIN — MONTELUKAST 10 MILLIGRAM(S): 4 TABLET, CHEWABLE ORAL at 22:38

## 2017-03-17 RX ADMIN — Medication 3 MILLILITER(S): at 08:28

## 2017-03-17 RX ADMIN — WARFARIN SODIUM 5 MILLIGRAM(S): 2.5 TABLET ORAL at 22:38

## 2017-03-17 RX ADMIN — ALBUTEROL 2 PUFF(S): 90 AEROSOL, METERED ORAL at 17:00

## 2017-03-18 LAB
CULTURE RESULTS: SIGNIFICANT CHANGE UP
CULTURE RESULTS: SIGNIFICANT CHANGE UP
HCT VFR BLD CALC: 34.8 % — SIGNIFICANT CHANGE UP (ref 34.5–45)
HGB BLD-MCNC: 11.3 G/DL — LOW (ref 11.5–15.5)
INR BLD: 1.16 RATIO — SIGNIFICANT CHANGE UP (ref 0.88–1.16)
MCHC RBC-ENTMCNC: 26.2 PG — LOW (ref 27–34)
MCHC RBC-ENTMCNC: 32.5 GM/DL — SIGNIFICANT CHANGE UP (ref 32–36)
MCV RBC AUTO: 80.5 FL — SIGNIFICANT CHANGE UP (ref 80–100)
PLATELET # BLD AUTO: 391 K/UL — SIGNIFICANT CHANGE UP (ref 150–400)
PROTHROM AB SERPL-ACNC: 13 SEC — SIGNIFICANT CHANGE UP (ref 10–13.1)
RBC # BLD: 4.32 M/UL — SIGNIFICANT CHANGE UP (ref 3.8–5.2)
RBC # FLD: 12.3 % — SIGNIFICANT CHANGE UP (ref 10.3–14.5)
SPECIMEN SOURCE: SIGNIFICANT CHANGE UP
SPECIMEN SOURCE: SIGNIFICANT CHANGE UP
WBC # BLD: 7.9 K/UL — SIGNIFICANT CHANGE UP (ref 3.8–10.5)
WBC # FLD AUTO: 7.9 K/UL — SIGNIFICANT CHANGE UP (ref 3.8–10.5)

## 2017-03-18 RX ADMIN — ALBUTEROL 2 PUFF(S): 90 AEROSOL, METERED ORAL at 11:58

## 2017-03-18 RX ADMIN — WARFARIN SODIUM 5 MILLIGRAM(S): 2.5 TABLET ORAL at 22:02

## 2017-03-18 RX ADMIN — MONTELUKAST 10 MILLIGRAM(S): 4 TABLET, CHEWABLE ORAL at 22:01

## 2017-03-18 RX ADMIN — PANTOPRAZOLE SODIUM 40 MILLIGRAM(S): 20 TABLET, DELAYED RELEASE ORAL at 05:45

## 2017-03-18 RX ADMIN — ALBUTEROL 2 PUFF(S): 90 AEROSOL, METERED ORAL at 18:01

## 2017-03-18 RX ADMIN — ENOXAPARIN SODIUM 80 MILLIGRAM(S): 100 INJECTION SUBCUTANEOUS at 18:01

## 2017-03-18 RX ADMIN — ENOXAPARIN SODIUM 80 MILLIGRAM(S): 100 INJECTION SUBCUTANEOUS at 05:45

## 2017-03-18 RX ADMIN — ALBUTEROL 2 PUFF(S): 90 AEROSOL, METERED ORAL at 22:01

## 2017-03-18 RX ADMIN — Medication 10 MILLIGRAM(S): at 05:45

## 2017-03-19 LAB
HCT VFR BLD CALC: 32.1 % — LOW (ref 34.5–45)
HGB BLD-MCNC: 10.7 G/DL — LOW (ref 11.5–15.5)
MCHC RBC-ENTMCNC: 26.6 PG — LOW (ref 27–34)
MCHC RBC-ENTMCNC: 33.3 GM/DL — SIGNIFICANT CHANGE UP (ref 32–36)
MCV RBC AUTO: 79.8 FL — LOW (ref 80–100)
PLATELET # BLD AUTO: 425 K/UL — HIGH (ref 150–400)
RBC # BLD: 4.03 M/UL — SIGNIFICANT CHANGE UP (ref 3.8–5.2)
RBC # FLD: 12.5 % — SIGNIFICANT CHANGE UP (ref 10.3–14.5)
WBC # BLD: 6.6 K/UL — SIGNIFICANT CHANGE UP (ref 3.8–10.5)
WBC # FLD AUTO: 6.6 K/UL — SIGNIFICANT CHANGE UP (ref 3.8–10.5)

## 2017-03-19 RX ADMIN — ENOXAPARIN SODIUM 80 MILLIGRAM(S): 100 INJECTION SUBCUTANEOUS at 05:53

## 2017-03-19 RX ADMIN — PANTOPRAZOLE SODIUM 40 MILLIGRAM(S): 20 TABLET, DELAYED RELEASE ORAL at 05:53

## 2017-03-19 RX ADMIN — MONTELUKAST 10 MILLIGRAM(S): 4 TABLET, CHEWABLE ORAL at 21:15

## 2017-03-19 RX ADMIN — ALBUTEROL 2 PUFF(S): 90 AEROSOL, METERED ORAL at 21:15

## 2017-03-19 RX ADMIN — ENOXAPARIN SODIUM 80 MILLIGRAM(S): 100 INJECTION SUBCUTANEOUS at 18:38

## 2017-03-19 RX ADMIN — Medication 10 MILLIGRAM(S): at 05:53

## 2017-03-19 RX ADMIN — ALBUTEROL 2 PUFF(S): 90 AEROSOL, METERED ORAL at 10:09

## 2017-03-19 RX ADMIN — WARFARIN SODIUM 5 MILLIGRAM(S): 2.5 TABLET ORAL at 21:15

## 2017-03-19 RX ADMIN — ALBUTEROL 2 PUFF(S): 90 AEROSOL, METERED ORAL at 14:10

## 2017-03-20 LAB
HCT VFR BLD CALC: 32.4 % — LOW (ref 34.5–45)
HGB BLD-MCNC: 10.7 G/DL — LOW (ref 11.5–15.5)
INR BLD: 1.21 RATIO — HIGH (ref 0.88–1.16)
MCHC RBC-ENTMCNC: 26.6 PG — LOW (ref 27–34)
MCHC RBC-ENTMCNC: 33.1 GM/DL — SIGNIFICANT CHANGE UP (ref 32–36)
MCV RBC AUTO: 80.5 FL — SIGNIFICANT CHANGE UP (ref 80–100)
PLATELET # BLD AUTO: 427 K/UL — HIGH (ref 150–400)
PROTHROM AB SERPL-ACNC: 13.6 SEC — HIGH (ref 10–13.1)
RBC # BLD: 4.03 M/UL — SIGNIFICANT CHANGE UP (ref 3.8–5.2)
RBC # FLD: 12.6 % — SIGNIFICANT CHANGE UP (ref 10.3–14.5)
WBC # BLD: 7.5 K/UL — SIGNIFICANT CHANGE UP (ref 3.8–10.5)
WBC # FLD AUTO: 7.5 K/UL — SIGNIFICANT CHANGE UP (ref 3.8–10.5)

## 2017-03-20 RX ORDER — WARFARIN SODIUM 2.5 MG/1
1 TABLET ORAL
Qty: 30 | Refills: 0 | OUTPATIENT
Start: 2017-03-20 | End: 2017-04-19

## 2017-03-20 RX ORDER — PANTOPRAZOLE SODIUM 20 MG/1
1 TABLET, DELAYED RELEASE ORAL
Qty: 0 | Refills: 0 | COMMUNITY
Start: 2017-03-20

## 2017-03-20 RX ORDER — MONTELUKAST 4 MG/1
1 TABLET, CHEWABLE ORAL
Qty: 0 | Refills: 0 | COMMUNITY
Start: 2017-03-20

## 2017-03-20 RX ORDER — ENOXAPARIN SODIUM 100 MG/ML
80 INJECTION SUBCUTANEOUS
Qty: 11 | Refills: 0 | OUTPATIENT
Start: 2017-03-20 | End: 2017-03-23

## 2017-03-20 RX ORDER — WARFARIN SODIUM 2.5 MG/1
5 TABLET ORAL ONCE
Qty: 0 | Refills: 0 | Status: COMPLETED | OUTPATIENT
Start: 2017-03-20 | End: 2017-03-20

## 2017-03-20 RX ADMIN — ALBUTEROL 2 PUFF(S): 90 AEROSOL, METERED ORAL at 22:19

## 2017-03-20 RX ADMIN — PANTOPRAZOLE SODIUM 40 MILLIGRAM(S): 20 TABLET, DELAYED RELEASE ORAL at 06:09

## 2017-03-20 RX ADMIN — Medication 10 MILLIGRAM(S): at 06:09

## 2017-03-20 RX ADMIN — ENOXAPARIN SODIUM 80 MILLIGRAM(S): 100 INJECTION SUBCUTANEOUS at 06:09

## 2017-03-20 RX ADMIN — ALBUTEROL 2 PUFF(S): 90 AEROSOL, METERED ORAL at 17:21

## 2017-03-20 RX ADMIN — MONTELUKAST 10 MILLIGRAM(S): 4 TABLET, CHEWABLE ORAL at 22:19

## 2017-03-20 RX ADMIN — ENOXAPARIN SODIUM 80 MILLIGRAM(S): 100 INJECTION SUBCUTANEOUS at 18:14

## 2017-03-20 RX ADMIN — ALBUTEROL 2 PUFF(S): 90 AEROSOL, METERED ORAL at 09:07

## 2017-03-20 RX ADMIN — WARFARIN SODIUM 5 MILLIGRAM(S): 2.5 TABLET ORAL at 22:19

## 2017-03-20 NOTE — DISCHARGE NOTE ADULT - MEDICATION SUMMARY - MEDICATIONS TO TAKE
I will START or STAY ON the medications listed below when I get home from the hospital:    enalapril 10 mg oral tablet  -- 1 tab(s) by mouth once a day  -- Indication: For HTN    enoxaparin 80 mg/0.8 mL injectable solution  -- 80 unit(s) injectable 2 times a day for 3 days  -- Indication: For PE    Coumadin 5 mg oral tablet  -- 1 tab(s) by mouth once a day  -- Indication: For PE    Dulera  --  inhaled , As Needed  -- Indication: For Asthma    Voltaren Topical 1% topical gel  -- Apply on skin to affected area 2 times a day  -- Avoid prolonged or excessive exposure to direct and/or artificial sunlight while taking this medication.  Do not take this drug if you are pregnant.  For external use only.    -- Indication: For OA    montelukast 10 mg oral tablet  -- 1 tab(s) by mouth once a day (at bedtime)  -- Indication: For Asthma    pantoprazole 40 mg oral delayed release tablet  -- 1 tab(s) by mouth once a day (before a meal)  -- Indication: For GERD I will START or STAY ON the medications listed below when I get home from the hospital:    enalapril 10 mg oral tablet  -- 1 tab(s) by mouth once a day  -- Indication: For HTN    Coumadin 5 mg oral tablet  -- 1 tab(s) by mouth once a day  -- Indication: For PE    enoxaparin 80 mg/0.8 mL injectable solution  -- 80 unit(s) injectable 2 times a day for 3 days  -- Indication: For PE    Dulera  --  inhaled , As Needed  -- Indication: For Asthma    Voltaren Topical 1% topical gel  -- Apply on skin to affected area 2 times a day  -- Avoid prolonged or excessive exposure to direct and/or artificial sunlight while taking this medication.  Do not take this drug if you are pregnant.  For external use only.    -- Indication: For OA    montelukast 10 mg oral tablet  -- 1 tab(s) by mouth once a day (at bedtime)  -- Indication: For Asthma    pantoprazole 40 mg oral delayed release tablet  -- 1 tab(s) by mouth once a day (before a meal)  -- Indication: For GERD

## 2017-03-20 NOTE — DISCHARGE NOTE ADULT - PLAN OF CARE
Anticoagulation: Goal INR 2-3 Continue with Enoxaparin 80 mg sc twice daily with bridging to warfarin. Take 5 mg oral warfarin daily. Monitor INR in 2 days Continue with your current medications

## 2017-03-20 NOTE — DISCHARGE NOTE ADULT - HOSPITAL COURSE
71 years old female from home with PMH of Asthma (with nasal polyp, on Singulair), HTN (on Lisinopril for years), OA, and multiple spontaneous abortions (no kids, unable to conceive) presented to the ED with dizziness and left leg pain.. CTA chest showed bilateral large PE. Patient was given NS 2L bolus of normal saline without much improvement in the BP therefore ICU was consulted. Pt was admitted to ICU for   1. Massive b/l pulmonary embolism and LLE DVT-  unprovoked. Initially was thought to be secondary to antiphospholipid syndrome/lupus, but all results came negative.  EKG showed sinus rhythm at 94bpm, no ST/T changes  TTE: - grade 1 DD , RV dialtionion, decrease  RV function , EF - 55%  Currently on Enoxaparin 80 mg IV q12, started bridging with Coumadin 5 mg PO qd. Goal INR 2-3. Patient to continue bridging with enoxaparin and warfarin for the next 3 days and to continue with warfarin after.   Patient to follow with hematologist as outpatient for further work up of unprovoked PE/DVT. Malignancy can not be excluded.   Hematologist: Dr. Aviles notified for consult .   2. HTN:  Enalapril 10 mg PO qd   Patient id medically stable to be discharged home with visiting nurse services 71 years old female from home with PMH of Asthma (with nasal polyp, on Singulair), HTN (on Lisinopril for years), OA, and multiple spontaneous abortions (no kids, unable to conceive) presented to the ED with dizziness and left leg pain.. CTA chest showed bilateral large PE. Patient was given NS 2L bolus of normal saline without much improvement in the BP therefore ICU was consulted. Pt was admitted to ICU for   1. Massive b/l pulmonary embolism and LLE DVT-  unprovoked. Initially was thought to be secondary to antiphospholipid syndrome/lupus, but all results came negative.  EKG showed sinus rhythm at 94bpm, no ST/T changes  TTE: - grade 1 DD , RV dialtionion, decrease  RV function , EF - 55%  Currently on Enoxaparin 80 mg IV q12, started bridging with Coumadin 5 mg PO qd. Goal INR 2-3. Patient to continue bridging with enoxaparin and warfarin for the next 3 days and to continue with warfarin after.   Patient to follow with hematologist as outpatient for further work up of unprovoked PE/DVT. Malignancy can not be excluded.   2. HTN:  Enalapril 10 mg PO qd   Patient id medically stable to be discharged home with visiting nurse services . Patients PMD Dr. Lacy ([phone]) notified, patient will follow with him in 2 days.

## 2017-03-20 NOTE — DISCHARGE NOTE ADULT - CARE PLAN
Principal Discharge DX:	Other acute pulmonary embolism with acute cor pulmonale  Goal:	Anticoagulation: Goal INR 2-3  Instructions for follow-up, activity and diet:	Continue with Enoxaparin 80 mg sc twice daily with bridging to warfarin. Take 5 mg oral warfarin daily. Monitor INR in 2 days  Secondary Diagnosis:	Asthma  Instructions for follow-up, activity and diet:	Continue with your current medications  Secondary Diagnosis:	Hypotension  Instructions for follow-up, activity and diet:	Continue with your current medications

## 2017-03-20 NOTE — DISCHARGE NOTE ADULT - PATIENT PORTAL LINK FT
“You can access the [facility] Patient Portal, offered by [facility], by registering with the following website: [email or web address]”

## 2017-03-21 VITALS
DIASTOLIC BLOOD PRESSURE: 90 MMHG | TEMPERATURE: 98 F | RESPIRATION RATE: 16 BRPM | HEART RATE: 76 BPM | OXYGEN SATURATION: 97 % | SYSTOLIC BLOOD PRESSURE: 156 MMHG

## 2017-03-21 PROCEDURE — 81291 MTHFR GENE: CPT

## 2017-03-21 PROCEDURE — 82803 BLOOD GASES ANY COMBINATION: CPT

## 2017-03-21 PROCEDURE — 83090 ASSAY OF HOMOCYSTEINE: CPT

## 2017-03-21 PROCEDURE — 86901 BLOOD TYPING SEROLOGIC RH(D): CPT

## 2017-03-21 PROCEDURE — 82553 CREATINE MB FRACTION: CPT

## 2017-03-21 PROCEDURE — 93970 EXTREMITY STUDY: CPT

## 2017-03-21 PROCEDURE — 85379 FIBRIN DEGRADATION QUANT: CPT

## 2017-03-21 PROCEDURE — 83735 ASSAY OF MAGNESIUM: CPT

## 2017-03-21 PROCEDURE — 81241 F5 GENE: CPT

## 2017-03-21 PROCEDURE — 96374 THER/PROPH/DIAG INJ IV PUSH: CPT

## 2017-03-21 PROCEDURE — 81240 F2 GENE: CPT

## 2017-03-21 PROCEDURE — 86160 COMPLEMENT ANTIGEN: CPT

## 2017-03-21 PROCEDURE — 86147 CARDIOLIPIN ANTIBODY EA IG: CPT

## 2017-03-21 PROCEDURE — 85303 CLOT INHIBIT PROT C ACTIVITY: CPT

## 2017-03-21 PROCEDURE — 85306 CLOT INHIBIT PROT S FREE: CPT

## 2017-03-21 PROCEDURE — 82550 ASSAY OF CK (CPK): CPT

## 2017-03-21 PROCEDURE — 83605 ASSAY OF LACTIC ACID: CPT

## 2017-03-21 PROCEDURE — 85730 THROMBOPLASTIN TIME PARTIAL: CPT

## 2017-03-21 PROCEDURE — 86235 NUCLEAR ANTIGEN ANTIBODY: CPT

## 2017-03-21 PROCEDURE — 87040 BLOOD CULTURE FOR BACTERIA: CPT

## 2017-03-21 PROCEDURE — 85220 BLOOC CLOT FACTOR V TEST: CPT

## 2017-03-21 PROCEDURE — 85307 ASSAY ACTIVATED PROTEIN C: CPT

## 2017-03-21 PROCEDURE — 86225 DNA ANTIBODY NATIVE: CPT

## 2017-03-21 PROCEDURE — 85613 RUSSELL VIPER VENOM DILUTED: CPT

## 2017-03-21 PROCEDURE — 86146 BETA-2 GLYCOPROTEIN ANTIBODY: CPT

## 2017-03-21 PROCEDURE — 84484 ASSAY OF TROPONIN QUANT: CPT

## 2017-03-21 PROCEDURE — 94640 AIRWAY INHALATION TREATMENT: CPT

## 2017-03-21 PROCEDURE — 85301 ANTITHROMBIN III ANTIGEN: CPT

## 2017-03-21 PROCEDURE — 85300 ANTITHROMBIN III ACTIVITY: CPT

## 2017-03-21 PROCEDURE — 71275 CT ANGIOGRAPHY CHEST: CPT

## 2017-03-21 PROCEDURE — 99285 EMERGENCY DEPT VISIT HI MDM: CPT | Mod: 25

## 2017-03-21 PROCEDURE — 74174 CTA ABD&PLVS W/CONTRAST: CPT

## 2017-03-21 PROCEDURE — 82272 OCCULT BLD FECES 1-3 TESTS: CPT

## 2017-03-21 PROCEDURE — 83880 ASSAY OF NATRIURETIC PEPTIDE: CPT

## 2017-03-21 PROCEDURE — 86850 RBC ANTIBODY SCREEN: CPT

## 2017-03-21 PROCEDURE — 80048 BASIC METABOLIC PNL TOTAL CA: CPT

## 2017-03-21 PROCEDURE — 85027 COMPLETE CBC AUTOMATED: CPT

## 2017-03-21 PROCEDURE — 84100 ASSAY OF PHOSPHORUS: CPT

## 2017-03-21 PROCEDURE — 71045 X-RAY EXAM CHEST 1 VIEW: CPT

## 2017-03-21 PROCEDURE — 93005 ELECTROCARDIOGRAM TRACING: CPT

## 2017-03-21 PROCEDURE — 86038 ANTINUCLEAR ANTIBODIES: CPT

## 2017-03-21 PROCEDURE — 85610 PROTHROMBIN TIME: CPT

## 2017-03-21 PROCEDURE — 80053 COMPREHEN METABOLIC PANEL: CPT

## 2017-03-21 RX ORDER — ENOXAPARIN SODIUM 100 MG/ML
80 INJECTION SUBCUTANEOUS
Qty: 11 | Refills: 0 | OUTPATIENT
Start: 2017-03-21 | End: 2017-03-25

## 2017-03-21 RX ORDER — WARFARIN SODIUM 2.5 MG/1
1 TABLET ORAL
Qty: 30 | Refills: 0 | OUTPATIENT
Start: 2017-03-21 | End: 2017-04-20

## 2017-03-21 RX ORDER — WARFARIN SODIUM 2.5 MG/1
6 TABLET ORAL
Qty: 0 | Refills: 0 | COMMUNITY
Start: 2017-03-21 | End: 2017-04-20

## 2017-03-21 RX ADMIN — ENOXAPARIN SODIUM 80 MILLIGRAM(S): 100 INJECTION SUBCUTANEOUS at 05:09

## 2017-03-21 RX ADMIN — PANTOPRAZOLE SODIUM 40 MILLIGRAM(S): 20 TABLET, DELAYED RELEASE ORAL at 05:11

## 2017-03-23 DIAGNOSIS — I27.2 OTHER SECONDARY PULMONARY HYPERTENSION: ICD-10-CM

## 2017-03-23 DIAGNOSIS — N96 RECURRENT PREGNANCY LOSS: ICD-10-CM

## 2017-03-23 DIAGNOSIS — M19.90 UNSPECIFIED OSTEOARTHRITIS, UNSPECIFIED SITE: ICD-10-CM

## 2017-03-23 DIAGNOSIS — I82.442 ACUTE EMBOLISM AND THROMBOSIS OF LEFT TIBIAL VEIN: ICD-10-CM

## 2017-03-23 DIAGNOSIS — I82.432 ACUTE EMBOLISM AND THROMBOSIS OF LEFT POPLITEAL VEIN: ICD-10-CM

## 2017-03-23 DIAGNOSIS — R57.8 OTHER SHOCK: ICD-10-CM

## 2017-03-23 DIAGNOSIS — J33.9 NASAL POLYP, UNSPECIFIED: ICD-10-CM

## 2017-03-23 DIAGNOSIS — I10 ESSENTIAL (PRIMARY) HYPERTENSION: ICD-10-CM

## 2017-03-23 DIAGNOSIS — J45.909 UNSPECIFIED ASTHMA, UNCOMPLICATED: ICD-10-CM

## 2017-03-23 DIAGNOSIS — I26.02 SADDLE EMBOLUS OF PULMONARY ARTERY WITH ACUTE COR PULMONALE: ICD-10-CM

## 2017-03-23 DIAGNOSIS — I27.9 PULMONARY HEART DISEASE, UNSPECIFIED: ICD-10-CM

## 2017-03-23 DIAGNOSIS — D68.61 ANTIPHOSPHOLIPID SYNDROME: ICD-10-CM

## 2017-03-23 DIAGNOSIS — I95.9 HYPOTENSION, UNSPECIFIED: ICD-10-CM

## 2017-03-23 DIAGNOSIS — J30.2 OTHER SEASONAL ALLERGIC RHINITIS: ICD-10-CM

## 2017-10-17 ENCOUNTER — INPATIENT (INPATIENT)
Facility: HOSPITAL | Age: 72
LOS: 0 days | Discharge: HOME | DRG: 313 | End: 2017-10-18
Attending: INTERNAL MEDICINE | Admitting: INTERNAL MEDICINE
Payer: MEDICARE

## 2017-10-17 VITALS
SYSTOLIC BLOOD PRESSURE: 145 MMHG | WEIGHT: 164.91 LBS | HEIGHT: 65 IN | TEMPERATURE: 98 F | HEART RATE: 77 BPM | OXYGEN SATURATION: 99 % | RESPIRATION RATE: 16 BRPM | DIASTOLIC BLOOD PRESSURE: 78 MMHG

## 2017-10-17 DIAGNOSIS — I82.409 ACUTE EMBOLISM AND THROMBOSIS OF UNSPECIFIED DEEP VEINS OF UNSPECIFIED LOWER EXTREMITY: ICD-10-CM

## 2017-10-17 DIAGNOSIS — I26.99 OTHER PULMONARY EMBOLISM WITHOUT ACUTE COR PULMONALE: ICD-10-CM

## 2017-10-17 DIAGNOSIS — R07.9 CHEST PAIN, UNSPECIFIED: ICD-10-CM

## 2017-10-17 DIAGNOSIS — Z86.79 PERSONAL HISTORY OF OTHER DISEASES OF THE CIRCULATORY SYSTEM: ICD-10-CM

## 2017-10-17 DIAGNOSIS — Z29.9 ENCOUNTER FOR PROPHYLACTIC MEASURES, UNSPECIFIED: ICD-10-CM

## 2017-10-17 PROBLEM — M19.90 UNSPECIFIED OSTEOARTHRITIS, UNSPECIFIED SITE: Chronic | Status: ACTIVE | Noted: 2017-03-13

## 2017-10-17 PROBLEM — J33.9 NASAL POLYP, UNSPECIFIED: Chronic | Status: ACTIVE | Noted: 2017-03-13

## 2017-10-17 PROBLEM — J45.909 UNSPECIFIED ASTHMA, UNCOMPLICATED: Chronic | Status: ACTIVE | Noted: 2017-03-13

## 2017-10-17 LAB
ALBUMIN SERPL ELPH-MCNC: 3.6 G/DL — SIGNIFICANT CHANGE UP (ref 3.5–5)
ALP SERPL-CCNC: 85 U/L — SIGNIFICANT CHANGE UP (ref 40–120)
ALT FLD-CCNC: 41 U/L DA — SIGNIFICANT CHANGE UP (ref 10–60)
ANION GAP SERPL CALC-SCNC: 6 MMOL/L — SIGNIFICANT CHANGE UP (ref 5–17)
APTT BLD: 50.8 SEC — HIGH (ref 27.5–37.4)
AST SERPL-CCNC: 25 U/L — SIGNIFICANT CHANGE UP (ref 10–40)
BASOPHILS # BLD AUTO: 0.1 K/UL — SIGNIFICANT CHANGE UP (ref 0–0.2)
BASOPHILS NFR BLD AUTO: 1.5 % — SIGNIFICANT CHANGE UP (ref 0–2)
BILIRUB SERPL-MCNC: 0.4 MG/DL — SIGNIFICANT CHANGE UP (ref 0.2–1.2)
BUN SERPL-MCNC: 13 MG/DL — SIGNIFICANT CHANGE UP (ref 7–18)
CALCIUM SERPL-MCNC: 9.2 MG/DL — SIGNIFICANT CHANGE UP (ref 8.4–10.5)
CHLORIDE SERPL-SCNC: 105 MMOL/L — SIGNIFICANT CHANGE UP (ref 96–108)
CK MB BLD-MCNC: <1.5 % — SIGNIFICANT CHANGE UP (ref 0–3.5)
CK MB CFR SERPL CALC: <1 NG/ML — SIGNIFICANT CHANGE UP (ref 0–3.6)
CK SERPL-CCNC: 65 U/L — SIGNIFICANT CHANGE UP (ref 21–215)
CO2 SERPL-SCNC: 27 MMOL/L — SIGNIFICANT CHANGE UP (ref 22–31)
CREAT SERPL-MCNC: 0.85 MG/DL — SIGNIFICANT CHANGE UP (ref 0.5–1.3)
EOSINOPHIL # BLD AUTO: 0.2 K/UL — SIGNIFICANT CHANGE UP (ref 0–0.5)
EOSINOPHIL NFR BLD AUTO: 2.8 % — SIGNIFICANT CHANGE UP (ref 0–6)
GLUCOSE SERPL-MCNC: 138 MG/DL — HIGH (ref 70–99)
HCT VFR BLD CALC: 38.9 % — SIGNIFICANT CHANGE UP (ref 34.5–45)
HGB BLD-MCNC: 12.6 G/DL — SIGNIFICANT CHANGE UP (ref 11.5–15.5)
INR BLD: 2.8 RATIO — HIGH (ref 0.88–1.16)
LYMPHOCYTES # BLD AUTO: 1.4 K/UL — SIGNIFICANT CHANGE UP (ref 1–3.3)
LYMPHOCYTES # BLD AUTO: 18.5 % — SIGNIFICANT CHANGE UP (ref 13–44)
MCHC RBC-ENTMCNC: 27.8 PG — SIGNIFICANT CHANGE UP (ref 27–34)
MCHC RBC-ENTMCNC: 32.5 GM/DL — SIGNIFICANT CHANGE UP (ref 32–36)
MCV RBC AUTO: 85.7 FL — SIGNIFICANT CHANGE UP (ref 80–100)
MONOCYTES # BLD AUTO: 0.3 K/UL — SIGNIFICANT CHANGE UP (ref 0–0.9)
MONOCYTES NFR BLD AUTO: 3.9 % — SIGNIFICANT CHANGE UP (ref 2–14)
NEUTROPHILS # BLD AUTO: 5.5 K/UL — SIGNIFICANT CHANGE UP (ref 1.8–7.4)
NEUTROPHILS NFR BLD AUTO: 73.3 % — SIGNIFICANT CHANGE UP (ref 43–77)
PLATELET # BLD AUTO: 318 K/UL — SIGNIFICANT CHANGE UP (ref 150–400)
POTASSIUM SERPL-MCNC: 3.8 MMOL/L — SIGNIFICANT CHANGE UP (ref 3.5–5.3)
POTASSIUM SERPL-SCNC: 3.8 MMOL/L — SIGNIFICANT CHANGE UP (ref 3.5–5.3)
PROT SERPL-MCNC: 8.3 G/DL — SIGNIFICANT CHANGE UP (ref 6–8.3)
PROTHROM AB SERPL-ACNC: 31.2 SEC — HIGH (ref 9.8–12.7)
RBC # BLD: 4.53 M/UL — SIGNIFICANT CHANGE UP (ref 3.8–5.2)
RBC # FLD: 13.2 % — SIGNIFICANT CHANGE UP (ref 10.3–14.5)
SODIUM SERPL-SCNC: 138 MMOL/L — SIGNIFICANT CHANGE UP (ref 135–145)
TROPONIN I SERPL-MCNC: <0.015 NG/ML — SIGNIFICANT CHANGE UP (ref 0–0.04)
WBC # BLD: 7.6 K/UL — SIGNIFICANT CHANGE UP (ref 3.8–10.5)
WBC # FLD AUTO: 7.6 K/UL — SIGNIFICANT CHANGE UP (ref 3.8–10.5)

## 2017-10-17 PROCEDURE — 71275 CT ANGIOGRAPHY CHEST: CPT | Mod: 26

## 2017-10-17 PROCEDURE — 99285 EMERGENCY DEPT VISIT HI MDM: CPT

## 2017-10-17 RX ORDER — ATORVASTATIN CALCIUM 80 MG/1
40 TABLET, FILM COATED ORAL AT BEDTIME
Qty: 0 | Refills: 0 | Status: DISCONTINUED | OUTPATIENT
Start: 2017-10-17 | End: 2017-10-18

## 2017-10-17 RX ORDER — ACETAMINOPHEN 500 MG
650 TABLET ORAL ONCE
Qty: 0 | Refills: 0 | Status: COMPLETED | OUTPATIENT
Start: 2017-10-17 | End: 2017-10-17

## 2017-10-17 RX ORDER — ASPIRIN/CALCIUM CARB/MAGNESIUM 324 MG
81 TABLET ORAL DAILY
Qty: 0 | Refills: 0 | Status: DISCONTINUED | OUTPATIENT
Start: 2017-10-17 | End: 2017-10-18

## 2017-10-17 RX ORDER — ASPIRIN/CALCIUM CARB/MAGNESIUM 324 MG
162 TABLET ORAL ONCE
Qty: 0 | Refills: 0 | Status: COMPLETED | OUTPATIENT
Start: 2017-10-17 | End: 2017-10-17

## 2017-10-17 RX ADMIN — Medication 650 MILLIGRAM(S): at 20:46

## 2017-10-17 NOTE — ED PROVIDER NOTE - OBJECTIVE STATEMENT
72 F with hx of HTN, pulmonary embolism, complaining of chest pain to R side of chest starting suddenly last night, now more in the back.  Patient states that pain started around the R shoulder and chest, now more to the back, was watching tv when it started.  No vomiting, nausea, no leg swelling, no other complaints.

## 2017-10-17 NOTE — H&P ADULT - PMH
Arthritis    Asthma    DVT (deep venous thrombosis)    History of hypertension    Nasal polyp    Pulmonary embolism    Seasonal allergies

## 2017-10-17 NOTE — H&P ADULT - NSHPLABSRESULTS_GEN_ALL_CORE
LABS:                        12.6   7.6   )-----------( 318      ( 17 Oct 2017 16:44 )             38.9     10-17    138  |  105  |  13  ----------------------------<  138<H>  3.8   |  27  |  0.85    Ca    9.2      17 Oct 2017 16:44    TPro  8.3  /  Alb  3.6  /  TBili  0.4  /  DBili  x   /  AST  25  /  ALT  41  /  AlkPhos  85  10-17    PT/INR - ( 17 Oct 2017 16:44 )   PT: 31.2 sec;   INR: 2.80 ratio         PTT - ( 17 Oct 2017 16:44 )  PTT:50.8 sec    CAPILLARY BLOOD GLUCOSE        LIVER FUNCTIONS - ( 17 Oct 2017 16:44 )  Alb: 3.6 g/dL / Pro: 8.3 g/dL / ALK PHOS: 85 U/L / ALT: 41 U/L DA / AST: 25 U/L / GGT: x             CARDIAC MARKERS ( 17 Oct 2017 16:44 )  <0.015 ng/mL / x     / 65 U/L / x     / <1.0 ng/mL    < from: CT Angio Chest w/ IV Cont (10.17.17 @ 18:52) >      PROCEDURE DATE:  10/17/2017          INTERPRETATION:  History: Chest pain, history of pulmonary emboli.    CTA chest.    Axial images coronal sagittal reformats, MIP images.  50 cc [id number] injected intravenously.  Comparison 3/13/2017.  There is interval resolution of prior acute bilateral pulmonary emboli.   No acute pulmonary emboli at this time. Weblike defect in in the right   pulmonary artery represents chronic sequelae of prior pulmonary emboli.   No evidence of acute pulmonary emboli at this time. Nonaneurysmal   thoracic aorta. Central airways are patent. No mediastinal adenopathy.  Bilateral mosaic parenchymal attenuation pattern similar to prior may   reflect air trapping secondary to small airways disease, and/or small   vessel disease. No lobar consolidation or pleural effusion. Small hiatal   hernia.  Heart not grossly enlarged. No pericardial abnormality.  With the exception of some colonic diverticula visualized upper abdomen   not remarkable.  No acute skeletal abnormality.    Impression:    No evidence of acute pulmonary emboli.  Chronic sequela as discussed.  Bilateral mosaic parenchymal attenuation may reflect chronic small   airways and/or small vessel disease      < end of copied text > LABS:                        12.6   7.6   )-----------( 318      ( 17 Oct 2017 16:44 )             38.9     10-17    138  |  105  |  13  ----------------------------<  138<H>  3.8   |  27  |  0.85    Ca    9.2      17 Oct 2017 16:44    TPro  8.3  /  Alb  3.6  /  TBili  0.4  /  DBili  x   /  AST  25  /  ALT  41  /  AlkPhos  85  10-17    PT/INR - ( 17 Oct 2017 16:44 )   PT: 31.2 sec;   INR: 2.80 ratio         PTT - ( 17 Oct 2017 16:44 )  PTT:50.8 sec    CAPILLARY BLOOD GLUCOSE        LIVER FUNCTIONS - ( 17 Oct 2017 16:44 )  Alb: 3.6 g/dL / Pro: 8.3 g/dL / ALK PHOS: 85 U/L / ALT: 41 U/L DA / AST: 25 U/L / GGT: x             CARDIAC MARKERS ( 17 Oct 2017 16:44 )  <0.015 ng/mL / x     / 65 U/L / x     / <1.0 ng/mL    < from: CT Angio Chest w/ IV Cont (10.17.17 @ 18:52) >      PROCEDURE DATE:  10/17/2017          INTERPRETATION:  History: Chest pain, history of pulmonary emboli.    CTA chest.    Axial images coronal sagittal reformats, MIP images.  50 cc [id number] injected intravenously.  Comparison 3/13/2017.  There is interval resolution of prior acute bilateral pulmonary emboli.   No acute pulmonary emboli at this time. Weblike defect in in the right   pulmonary artery represents chronic sequelae of prior pulmonary emboli.   No evidence of acute pulmonary emboli at this time. Nonaneurysmal   thoracic aorta. Central airways are patent. No mediastinal adenopathy.  Bilateral mosaic parenchymal attenuation pattern similar to prior may   reflect air trapping secondary to small airways disease, and/or small   vessel disease. No lobar consolidation or pleural effusion. Small hiatal   hernia.  Heart not grossly enlarged. No pericardial abnormality.  With the exception of some colonic diverticula visualized upper abdomen   not remarkable.  No acute skeletal abnormality.    Impression:    No evidence of acute pulmonary emboli.  Chronic sequela as discussed.  Bilateral mosaic parenchymal attenuation may reflect chronic small   airways and/or small vessel disease      < end of copied text >    < from: Transthoracic Echocardiogram (03.13.17 @ 10:50) >    CONCLUSIONS:  1. Normal mitral valve. Mild to moderate mitral  regurgitation.  2. Normal trileaflet aortic valve.  3. Normal aortic root.  4. Mild left atrial enlargement.  5. Normal left ventricular internal dimensions and wall  thicknesses.  6. Normal Left Ventricular Systolic Function,  (EF = 55%)  7. Grade I diastolic dysfunction  8. Normal right atrium.A prominent Eustachian valve is  identified.  This is a normal variant.  9. Right ventricular enlargement with decreased RV  function.  10. There is trace tricuspid regurgitation.  11. Normal pulmonic valve.  12. Normal pericardium with no pericardial effusion.    < end of copied text >

## 2017-10-17 NOTE — H&P ADULT - PROBLEM SELECTOR PLAN 3
-On Warfarin with therapeutic INR  -No evidence of new acute DVT on physical exam  -Will continue warfarin 6mg daily. Will give 5mg today.

## 2017-10-17 NOTE — H&P ADULT - ASSESSMENT
Patient is a 72y old  Female who presents with a chief complaint of Patient is a 72y old  Female who presents with a chief complaint of right sided chest pain, is admitted to the telemetry floor for evaluation of chest pain.

## 2017-10-17 NOTE — H&P ADULT - PROBLEM SELECTOR PLAN 2
-On Warfarin with therapeutic INR, CT evidence of resolving clots  -Will continue warfarin 6mg daily. Will give 5mg today.

## 2017-10-17 NOTE — H&P ADULT - PROBLEM SELECTOR PLAN 4
-Continue Enalapril at home dose  -Will start metoprolol with parameter for ACS protocol  -monitor BP

## 2017-10-17 NOTE — H&P ADULT - PROBLEM SELECTOR PLAN 1
-No new PE in CT angio, patient is fully anticoagulated with therapeutic level of warfarin.   -EKG NSR at 83BPM, no STT change  -Trend troponins, monitor telemetry  -JUAN score 1  -Starting ACS protocol with aspirin, lipitor, and metoprolol, can discontinue if ACS is ruled out  -TTE in 3/2017: GI DD, normal EF, RV dysfunction. Would not repeat at this time -No new PE in CT angio, patient is fully anticoagulated with therapeutic level of warfarin.   -EKG NSR at 83BPM, no STT change  -Trend troponins, monitor telemetry  -JUAN score 1  -Starting ACS protocol with aspirin, lipitor, and metoprolol, can discontinue if ACS is ruled out  -TTE in 3/2017: GI DD, normal EF, RV dysfunction. Would not repeat at this time  -Cardio Dr. Singletary

## 2017-10-17 NOTE — H&P ADULT - HISTORY OF PRESENT ILLNESS
71 years old female from home with PMH of Asthma (with nasal polyp, on Singulair), HTN (on Lisinopril for years), OA, and multiple spontaneous abortions (no kids, unable to conceive) presented to the ED with dizziness and left leg pain.. CTA chest showed bilateral large PE. Patient was given NS 2L bolus of normal saline without much improvement in the BP therefore ICU was consulted. Pt was admitted to ICU for   1. Massive b/l pulmonary embolism and LLE DVT-  unprovoked. Initially was thought to be secondary to antiphospholipid syndrome/lupus, but all results came negative.  EKG showed sinus rhythm at 94bpm, no ST/T changes  TTE: - grade 1 DD , RV dialtionion, decrease  RV function , EF - 55%  Currently on Enoxaparin 80 mg IV q12, started bridging with Coumadin 5 mg PO qd. Goal INR 2-3. Patient to continue bridging with enoxaparin and warfarin for the next 3 days and to continue with warfarin after.   Patient to follow with hematologist as outpatient for further work up of unprovoked PE/DVT. Malignancy can not be excluded.   2. HTN:  Enalapril 10 mg PO qd 72 years old female from home with PMH of Asthma (with nasal polyp, on Singulair), HTN (on Lisinopril for years), OA, and multiple spontaneous abortions (no kids, unable to conceive), and Massive b/l PE with LLE DVT(required ICU admission in 3/2017 at [facility], on warfarin),  presented to the ED       Currently on Enoxaparin 80 mg IV q12, started bridging with Coumadin 5 mg PO qd. Goal INR 2-3. Patient to continue bridging with enoxaparin and warfarin for the next 3 days and to continue with warfarin after.   Patient to follow with hematologist as outpatient for further work up of unprovoked PE/DVT. Malignancy can not be excluded.   2. HTN:  Enalapril 10 mg PO qd 72 years old female from home lives with sisters, with PMH of Asthma (with nasal polyp, on Singulair), HTN (on Lisinopril for years), OA, and multiple spontaneous abortions (no kids, unable to conceive), and Massive b/l PE with LLE DVT(required ICU admission in 3/2017 at [facility], on warfarin),  presented to the ED c/o right sided aching pain, radiating to right shoulder, back and lung started last night. Last night she had high blood pressure-she does not remember how high it was, but it was around 170/125-, so she took extra dose of enalapril 10mg and somewhat achieved relief and fell asleep. This morning when she was watching TV, same pain came back with intensity of 5/10 and lasted for about 1.5hrs so she was concerned and decided to seek medical help. Pain is on and off, and is different from the pain from PE that she experienced last time. Denies having SOB, palpitation, orthopnea, fever, chills, or any other symptoms.    In ED, patient was given 1 dose of 162mg aspirin, vital signs are stable, labs stable, currently patient is asymptomatic. CT angio was performed, showing resolving previous PE but no new PE. EKG NSR at 83 BPM, cardiac enzymes x 1 negative. Admitted to the telemetry floor for evaluation of chest pain. 72 years old female from home lives with sisters, with PMH of Asthma (with nasal polyp, on Singulair), HTN (on Lisinopril for years), OA, and multiple spontaneous abortions (no kids, unable to conceive), and Massive b/l PE with LLE DVT(required ICU admission in 3/2017 at [facility], on warfarin),  presented to the ED c/o right sided aching pain, radiating to right shoulder, back and lung started last night. Last night she had high blood pressure-she does not remember how high it was, but it was around 170/125-, so she took extra dose of enalapril 10mg and somewhat achieved relief and fell asleep. This morning when she was watching TV, same pain came back with intensity of 5/10 so decided to seek medical help. Pain is on and off, and is different from the pain from PE that she experienced last time. Denies having SOB, palpitation, orthopnea, fever, chills, or any other symptoms.    In ED, patient was given 1 dose of 162mg aspirin, vital signs are stable, labs stable, currently patient is asymptomatic. CT angio was performed, showing resolving previous PE but no new PE. EKG NSR at 83 BPM, cardiac enzymes x 1 negative. Admitted to the telemetry floor for evaluation of chest pain.

## 2017-10-17 NOTE — ED PROVIDER NOTE - MEDICAL DECISION MAKING DETAILS
will need to eval for PE given hx, high risk so will CTA, will also check CE and if not PE will still need to eval for ACS symptoms.

## 2017-10-17 NOTE — H&P ADULT - NSHPPHYSICALEXAM_GEN_ALL_CORE
PHYSICAL EXAM:  GENERAL: NAD, well-groomed, well-developed  HEAD:  Atraumatic, Normocephalic  EYES: EOMI, PERRLA, conjunctiva and sclera clear  ENMT: No tonsillar erythema, exudates, or enlargement; Moist mucous membranes, Good dentition, No lesions  NECK: Supple, No JVD, Normal thyroid  NERVOUS SYSTEM:  Alert & Oriented X3, Good concentration; Motor Strength 5/5 B/L upper and lower extremities; DTRs 2+ intact and symmetric  CHEST/LUNG: Clear to percussion bilaterally; No rales, rhonchi, wheezing, or rubs  HEART: Regular rate and rhythm; No murmurs, rubs, or gallops  ABDOMEN: Soft, Nontender, Nondistended; Bowel sounds present  EXTREMITIES:  2+ Peripheral Pulses bilaterally, No clubbing, cyanosis, or edema  LYMPH: No lymphadenopathy noted  SKIN: No rashes or lesions    T(C): 36.6 (10-17-17 @ 15:21), Max: 36.6 (10-17-17 @ 15:21)  HR: 77 (10-17-17 @ 15:21) (77 - 77)  BP: 145/78 (10-17-17 @ 15:21) (145/78 - 145/78)  RR: 16 (10-17-17 @ 15:21) (16 - 16)  SpO2: 99% (10-17-17 @ 15:21) (99% - 99%)  Wt(kg): --  I&O's Summary PHYSICAL EXAM:  GENERAL: NAD, well-groomed, well-developed  HEAD:  Atraumatic, Normocephalic  EYES: EOMI, PERRLA, conjunctiva and sclera clear  ENMT: Moist mucous membranes, Good dentition, No lesions  NECK: Supple, No JVD, Normal thyroid  NERVOUS SYSTEM:  Alert & Oriented X3, Good concentration; Motor Strength 5/5 B/L upper and lower extremities  CHEST/LUNG: Clear to percussion bilaterally; No rales, rhonchi, wheezing, or rubs  HEART: Regular rate and rhythm; No murmurs, rubs, or gallops  ABDOMEN: Soft, Nontender, Nondistended; Bowel sounds present  EXTREMITIES:  2+ Peripheral Pulses bilaterally, No clubbing, cyanosis, or edema  LYMPH: No lymphadenopathy noted  SKIN: No rashes or lesions    T(C): 36.6 (10-17-17 @ 15:21), Max: 36.6 (10-17-17 @ 15:21)  HR: 77 (10-17-17 @ 15:21) (77 - 77)  BP: 145/78 (10-17-17 @ 15:21) (145/78 - 145/78)  RR: 16 (10-17-17 @ 15:21) (16 - 16)  SpO2: 99% (10-17-17 @ 15:21) (99% - 99%)  Wt(kg): --  I&O's Summary

## 2017-10-18 VITALS
DIASTOLIC BLOOD PRESSURE: 72 MMHG | HEART RATE: 66 BPM | TEMPERATURE: 98 F | RESPIRATION RATE: 18 BRPM | SYSTOLIC BLOOD PRESSURE: 102 MMHG | OXYGEN SATURATION: 99 %

## 2017-10-18 DIAGNOSIS — M25.511 PAIN IN RIGHT SHOULDER: ICD-10-CM

## 2017-10-18 LAB
ANION GAP SERPL CALC-SCNC: 6 MMOL/L — SIGNIFICANT CHANGE UP (ref 5–17)
BUN SERPL-MCNC: 13 MG/DL — SIGNIFICANT CHANGE UP (ref 7–18)
CALCIUM SERPL-MCNC: 9.3 MG/DL — SIGNIFICANT CHANGE UP (ref 8.4–10.5)
CHLORIDE SERPL-SCNC: 107 MMOL/L — SIGNIFICANT CHANGE UP (ref 96–108)
CHOLEST SERPL-MCNC: 223 MG/DL — HIGH (ref 10–199)
CK MB BLD-MCNC: <1.4 % — SIGNIFICANT CHANGE UP (ref 0–3.5)
CK MB BLD-MCNC: <1.7 % — SIGNIFICANT CHANGE UP (ref 0–3.5)
CK MB CFR SERPL CALC: <1 NG/ML — SIGNIFICANT CHANGE UP (ref 0–3.6)
CK MB CFR SERPL CALC: <1 NG/ML — SIGNIFICANT CHANGE UP (ref 0–3.6)
CK SERPL-CCNC: 60 U/L — SIGNIFICANT CHANGE UP (ref 21–215)
CK SERPL-CCNC: 71 U/L — SIGNIFICANT CHANGE UP (ref 21–215)
CO2 SERPL-SCNC: 27 MMOL/L — SIGNIFICANT CHANGE UP (ref 22–31)
CREAT SERPL-MCNC: 0.76 MG/DL — SIGNIFICANT CHANGE UP (ref 0.5–1.3)
GLUCOSE SERPL-MCNC: 86 MG/DL — SIGNIFICANT CHANGE UP (ref 70–99)
HBA1C BLD-MCNC: 5.6 % — SIGNIFICANT CHANGE UP (ref 4–5.6)
HCT VFR BLD CALC: 38.6 % — SIGNIFICANT CHANGE UP (ref 34.5–45)
HDLC SERPL-MCNC: 64 MG/DL — SIGNIFICANT CHANGE UP (ref 40–125)
HGB BLD-MCNC: 12.2 G/DL — SIGNIFICANT CHANGE UP (ref 11.5–15.5)
INR BLD: 2.52 RATIO — HIGH (ref 0.88–1.16)
LIPID PNL WITH DIRECT LDL SERPL: 143 MG/DL — SIGNIFICANT CHANGE UP
MAGNESIUM SERPL-MCNC: 2.2 MG/DL — SIGNIFICANT CHANGE UP (ref 1.6–2.6)
MCHC RBC-ENTMCNC: 26.8 PG — LOW (ref 27–34)
MCHC RBC-ENTMCNC: 31.6 GM/DL — LOW (ref 32–36)
MCV RBC AUTO: 85 FL — SIGNIFICANT CHANGE UP (ref 80–100)
PHOSPHATE SERPL-MCNC: 3.6 MG/DL — SIGNIFICANT CHANGE UP (ref 2.5–4.5)
PLATELET # BLD AUTO: 319 K/UL — SIGNIFICANT CHANGE UP (ref 150–400)
POTASSIUM SERPL-MCNC: 3.7 MMOL/L — SIGNIFICANT CHANGE UP (ref 3.5–5.3)
POTASSIUM SERPL-SCNC: 3.7 MMOL/L — SIGNIFICANT CHANGE UP (ref 3.5–5.3)
PROTHROM AB SERPL-ACNC: 28 SEC — HIGH (ref 9.8–12.7)
RBC # BLD: 4.54 M/UL — SIGNIFICANT CHANGE UP (ref 3.8–5.2)
RBC # FLD: 12.8 % — SIGNIFICANT CHANGE UP (ref 10.3–14.5)
SODIUM SERPL-SCNC: 140 MMOL/L — SIGNIFICANT CHANGE UP (ref 135–145)
TOTAL CHOLESTEROL/HDL RATIO MEASUREMENT: 3.5 RATIO — SIGNIFICANT CHANGE UP (ref 3.3–7.1)
TRIGL SERPL-MCNC: 79 MG/DL — SIGNIFICANT CHANGE UP (ref 10–149)
TROPONIN I SERPL-MCNC: <0.015 NG/ML — SIGNIFICANT CHANGE UP (ref 0–0.04)
TROPONIN I SERPL-MCNC: <0.015 NG/ML — SIGNIFICANT CHANGE UP (ref 0–0.04)
TSH SERPL-MCNC: 2.81 UU/ML — SIGNIFICANT CHANGE UP (ref 0.34–4.82)
VIT B12 SERPL-MCNC: 784 PG/ML — SIGNIFICANT CHANGE UP (ref 243–894)
WBC # BLD: 6 K/UL — SIGNIFICANT CHANGE UP (ref 3.8–10.5)
WBC # FLD AUTO: 6 K/UL — SIGNIFICANT CHANGE UP (ref 3.8–10.5)

## 2017-10-18 PROCEDURE — 71275 CT ANGIOGRAPHY CHEST: CPT

## 2017-10-18 PROCEDURE — 83036 HEMOGLOBIN GLYCOSYLATED A1C: CPT

## 2017-10-18 PROCEDURE — 82550 ASSAY OF CK (CPK): CPT

## 2017-10-18 PROCEDURE — 83735 ASSAY OF MAGNESIUM: CPT

## 2017-10-18 PROCEDURE — 80053 COMPREHEN METABOLIC PANEL: CPT

## 2017-10-18 PROCEDURE — 80048 BASIC METABOLIC PNL TOTAL CA: CPT

## 2017-10-18 PROCEDURE — 82607 VITAMIN B-12: CPT

## 2017-10-18 PROCEDURE — 90686 IIV4 VACC NO PRSV 0.5 ML IM: CPT

## 2017-10-18 PROCEDURE — 84484 ASSAY OF TROPONIN QUANT: CPT

## 2017-10-18 PROCEDURE — 84443 ASSAY THYROID STIM HORMONE: CPT

## 2017-10-18 PROCEDURE — 85730 THROMBOPLASTIN TIME PARTIAL: CPT

## 2017-10-18 PROCEDURE — G0378: CPT

## 2017-10-18 PROCEDURE — 85027 COMPLETE CBC AUTOMATED: CPT

## 2017-10-18 PROCEDURE — 93005 ELECTROCARDIOGRAM TRACING: CPT

## 2017-10-18 PROCEDURE — 99222 1ST HOSP IP/OBS MODERATE 55: CPT

## 2017-10-18 PROCEDURE — 85610 PROTHROMBIN TIME: CPT

## 2017-10-18 PROCEDURE — 84100 ASSAY OF PHOSPHORUS: CPT

## 2017-10-18 PROCEDURE — 99285 EMERGENCY DEPT VISIT HI MDM: CPT | Mod: 25

## 2017-10-18 PROCEDURE — 82553 CREATINE MB FRACTION: CPT

## 2017-10-18 PROCEDURE — 80061 LIPID PANEL: CPT

## 2017-10-18 RX ORDER — LORATADINE 10 MG/1
10 TABLET ORAL DAILY
Qty: 0 | Refills: 0 | Status: DISCONTINUED | OUTPATIENT
Start: 2017-10-18 | End: 2017-10-18

## 2017-10-18 RX ORDER — ATORVASTATIN CALCIUM 80 MG/1
1 TABLET, FILM COATED ORAL
Qty: 0 | Refills: 0 | COMMUNITY
Start: 2017-10-18

## 2017-10-18 RX ORDER — WARFARIN SODIUM 2.5 MG/1
1 TABLET ORAL
Qty: 30 | Refills: 0 | OUTPATIENT
Start: 2017-10-18 | End: 2017-11-17

## 2017-10-18 RX ORDER — WARFARIN SODIUM 2.5 MG/1
6 TABLET ORAL ONCE
Qty: 0 | Refills: 0 | Status: DISCONTINUED | OUTPATIENT
Start: 2017-10-18 | End: 2017-10-18

## 2017-10-18 RX ORDER — CETIRIZINE HYDROCHLORIDE 10 MG/1
1 TABLET ORAL
Qty: 0 | Refills: 0 | COMMUNITY

## 2017-10-18 RX ORDER — WARFARIN SODIUM 2.5 MG/1
1 TABLET ORAL
Qty: 0 | Refills: 0 | COMMUNITY
Start: 2017-10-18

## 2017-10-18 RX ORDER — WARFARIN SODIUM 2.5 MG/1
6 TABLET ORAL DAILY
Qty: 0 | Refills: 0 | Status: DISCONTINUED | OUTPATIENT
Start: 2017-10-17 | End: 2017-10-18

## 2017-10-18 RX ORDER — INFLUENZA VIRUS VACCINE 15; 15; 15; 15 UG/.5ML; UG/.5ML; UG/.5ML; UG/.5ML
0.5 SUSPENSION INTRAMUSCULAR ONCE
Qty: 0 | Refills: 0 | Status: COMPLETED | OUTPATIENT
Start: 2017-10-18 | End: 2017-10-18

## 2017-10-18 RX ORDER — WARFARIN SODIUM 2.5 MG/1
1 TABLET ORAL
Qty: 30 | Refills: 0
Start: 2017-10-18 | End: 2017-11-17

## 2017-10-18 RX ORDER — MONTELUKAST 4 MG/1
1 TABLET, CHEWABLE ORAL
Qty: 0 | Refills: 0 | DISCHARGE
Start: 2017-10-18

## 2017-10-18 RX ORDER — ATORVASTATIN CALCIUM 80 MG/1
1 TABLET, FILM COATED ORAL
Qty: 30 | Refills: 0
Start: 2017-10-18 | End: 2017-11-17

## 2017-10-18 RX ORDER — MONTELUKAST 4 MG/1
10 TABLET, CHEWABLE ORAL AT BEDTIME
Qty: 0 | Refills: 0 | Status: DISCONTINUED | OUTPATIENT
Start: 2017-10-17 | End: 2017-10-18

## 2017-10-18 RX ORDER — WARFARIN SODIUM 2.5 MG/1
5 TABLET ORAL ONCE
Qty: 0 | Refills: 0 | Status: COMPLETED | OUTPATIENT
Start: 2017-10-18 | End: 2017-10-18

## 2017-10-18 RX ORDER — FLUTICASONE PROPIONATE 50 MCG
1 SPRAY, SUSPENSION NASAL
Qty: 0 | Refills: 0 | Status: DISCONTINUED | OUTPATIENT
Start: 2017-10-18 | End: 2017-10-18

## 2017-10-18 RX ORDER — METOPROLOL TARTRATE 50 MG
25 TABLET ORAL
Qty: 0 | Refills: 0 | Status: DISCONTINUED | OUTPATIENT
Start: 2017-10-18 | End: 2017-10-18

## 2017-10-18 RX ORDER — ATORVASTATIN CALCIUM 80 MG/1
1 TABLET, FILM COATED ORAL
Qty: 30 | Refills: 0 | OUTPATIENT
Start: 2017-10-18 | End: 2017-11-17

## 2017-10-18 RX ADMIN — LORATADINE 10 MILLIGRAM(S): 10 TABLET ORAL at 11:27

## 2017-10-18 RX ADMIN — Medication 20 MILLIGRAM(S): at 06:30

## 2017-10-18 RX ADMIN — INFLUENZA VIRUS VACCINE 0.5 MILLILITER(S): 15; 15; 15; 15 SUSPENSION INTRAMUSCULAR at 16:58

## 2017-10-18 RX ADMIN — WARFARIN SODIUM 5 MILLIGRAM(S): 2.5 TABLET ORAL at 00:25

## 2017-10-18 RX ADMIN — Medication 25 MILLIGRAM(S): at 06:30

## 2017-10-18 NOTE — DISCHARGE NOTE ADULT - PATIENT PORTAL LINK FT
“You can access the [facility] Patient Portal, offered by [facility], by registering with the following website: [email or web address]”

## 2017-10-18 NOTE — DISCHARGE NOTE ADULT - HOSPITAL COURSE
72 years old female from home lives with sisters, with PMH of Asthma (with nasal polyp, on Singulair), HTN (on Lisinopril for years), OA, and multiple spontaneous abortions (no kids, unable to conceive), and Massive b/l PE with LLE DVT(required ICU admission in 3/2017 at [facility], on warfarin),  presented to the ED c/o right sided aching pain, radiating to right shoulder, back and lung started last night. Last night she had high blood pressure-she does not remember how high it was, but it was around 170/125-, so she took extra dose of enalapril 10mg and somewhat achieved relief and fell asleep. This morning when she was watching TV, same pain came back with intensity of 5/10 so decided to seek medical help. Pain is on and off, and is different from the pain from PE that she experienced last time. Denies having SOB, palpitation, orthopnea, fever, chills, or any other symptoms.    In ED, patient was given 1 dose of 162mg aspirin, vital signs are stable, labs stable, currently patient is asymptomatic. CT angio was performed, showing resolving previous PE but no new PE. EKG NSR at 83 BPM, cardiac enzymes x 3negative. Admitted to the telemetry floor for evaluation of chest pain.    cardiology Dr Singletary evaluated the patient and recommended no further cardiac workup needed.   patient also had neck pain and right shoulder pain, neurology Dr russo evaluated the patient, pain in not neurologic as per neurology.    Plan of care discussed with the patient and the attending and patient is been discharged home

## 2017-10-18 NOTE — CONSULT NOTE ADULT - ATTENDING COMMENTS
Thank you for the courtesy of the consultation,I would be available for any further discussion if needed.  Carlos Singletary MD,FACC.  [address] [place], [place] [id number] [phone]
See above  I do not see any neurological issue at this point and time.

## 2017-10-18 NOTE — PROGRESS NOTE ADULT - PROBLEM SELECTOR PLAN 2
-On Warfarin with therapeutic INR, CT evidence of resolving clots  -Will continue warfarin 6mg daily.   - will continue home dose   - monitor pt/inr in am

## 2017-10-18 NOTE — DISCHARGE NOTE ADULT - MEDICATION SUMMARY - MEDICATIONS TO TAKE
I will START or STAY ON the medications listed below when I get home from the hospital:    enalapril 20 mg oral tablet  -- 1 tab(s) by mouth once a day  -- Indication: For Htn    warfarin 6 mg oral tablet  -- 1 tab(s) by mouth once  -- Indication: For afib    levocetirizine 5 mg oral tablet  -- 10 milligram(s) by mouth once a day  -- Indication: For allergy     atorvastatin 40 mg oral tablet  -- 1 tab(s) by mouth once a day (at bedtime)  -- Indication: For HLD    montelukast 10 mg oral tablet  -- 1 tab(s) by mouth once a day (at bedtime)  -- Indication: For asthma

## 2017-10-18 NOTE — PROGRESS NOTE ADULT - ASSESSMENT
charts reviewed, d/w er, resident.  pt seen and examined.  came with severe headache, acute onset.  no nausea or vomiting, some neck discomfort, later on rt sided CP non radiating.  pt has nasal polyp  not putting any spray incl afrin or so.  in er bp was about 180/100, as per pt takes lisinopril very regularaly .  got some antihypertensive  got better.  had h/o pe  had normal INR  still had ctscan of chest and PE ruled out.  pt is headache free.  no nausea.  nasal cavity has polyps,  she doesnt  remember seing ent.  had ct scan of ?head and neck.  no sinus tenderness.   neurology  sudden onset of headach with HTN  on Coumadin.  bp ok  neurology consult
Patient is a 72y old  Female who presents with a chief complaint of right sided chest pain, is admitted to the telemetry floor for evaluation of chest pain.

## 2017-10-18 NOTE — CONSULT NOTE ADULT - PROBLEM SELECTOR RECOMMENDATION 9
May need to be seen by an ortho/shoulder specialist. this could be from osteoarthritis or may be atypical form of ligamental inflammation of the right shoulder or as rare case ? early frozen shoulder

## 2017-10-18 NOTE — CONSULT NOTE ADULT - SUBJECTIVE AND OBJECTIVE BOX
CHIEF COMPLAINT:    HPI: 72 years old female from home lives with sisters, with PMH of Asthma (with nasal polyp, on Singulair), HTN (on Lisinopril for years), OA, and multiple spontaneous abortions (no kids, unable to conceive), and Massive b/l PE with LLE DVT(required ICU admission in 3/2017 at [facility], on warfarin),  presented to the ED c/o right sided aching pain, radiating to right shoulder, back and lung started last night. Last night she had high blood pressure-she does not remember how high it was, but it was around 170/125-, so she took extra dose of enalapril 10mg and somewhat achieved relief and fell asleep. This morning when she was watching TV, same pain came back with intensity of 5/10 so decided to seek medical help. Pain is on and off, and is different from the pain from PE that she experienced last time. Denies having SOB, palpitation, orthopnea, fever, chills, or any other symptoms.    In ED, patient was given 1 dose of 162mg aspirin, vital signs are stable, labs stable, currently patient is asymptomatic. CT angio was performed, showing resolving previous PE but no new PE. EKG NSR at 83 BPM, cardiac enzymes x 1 negative. Admitted to the telemetry floor for evaluation of chest pain.C/o right shoulder pain.    PAST MEDICAL & SURGICAL HISTORY:  DVT (deep venous thrombosis)  Pulmonary embolism-3/2017  Nasal polyp  Asthma  Arthritis  Seasonal allergies  History of hypertension  No significant past surgical history      MEDICATIONS  (STANDING):  aspirin  chewable 81 milliGRAM(s) Oral daily  atorvastatin 40 milliGRAM(s) Oral at bedtime  enalapril 20 milliGRAM(s) Oral daily  influenza   Vaccine 0.5 milliLiter(s) IntraMuscular once  loratadine 10 milliGRAM(s) Oral daily  metoprolol 25 milliGRAM(s) Oral two times a day  montelukast 10 milliGRAM(s) Oral at bedtime      FAMILY HISTORY:  Diabetes mellitus (Sibling)  No family history of premature coronary artery disease or sudden cardiac death    SOCIAL HISTORY:  Smoking-Non Smoker  Alcohol-Denies  Ilicit Drug use-Denies    REVIEW OF SYSTEMS:  Constitutional: [ ] fever, [ ]weight loss, [ ]fatigue Activity [ ] Bedbound,[x ] Ambulates [x ] Unassisted[ ] Cane/Walker [ ] Assistence.  Eyes: [ ] visual changes  Respiratory: [x ]shortness of breath;  [ ] cough, [ ]wheezing, [ ]chills, [ ]hemoptysis  Cardiovascular: [x ] chest pain, [ ]palpitations, [ ]dizziness,  [ ]leg swelling[ ]orthopnea [ ]PND  Gastrointestinal: [ ] abdominal pain, [ ]nausea, [ ]vomiting,  [ ]diarrhea,[ ]constipation  Genitourinary: [ ] dysuria, [ ] hematuria  Neurologic: [ ] headaches [ ] tremors[ ] weakness  Skin: [ ] itching, [ ]burning, [ ] rashes  Endocrine: [ ] heat or cold intolerance  Musculoskeletal: [ ] joint pain or swelling; [ ] muscle, back, or extremity pain  Psychiatric: [ ] depression, [ ]anxiety, [ ]mood swings, or [ ]difficulty sleeping  Hematologic: [ ] easy bruising, [ ] bleeding gums       [ x] All others negative	  [ ] Unable to obtain    Vital Signs Last 24 Hrs  T(C): 36.4 (18 Oct 2017 08:00), Max: 37 (18 Oct 2017 01:34)  T(F): 97.6 (18 Oct 2017 08:00), Max: 98.6 (18 Oct 2017 01:34)  HR: 55 (18 Oct 2017 08:00) (54 - 77)  BP: 142/76 (18 Oct 2017 08:00) (117/71 - 158/81)  RR: 17 (18 Oct 2017 08:00) (16 - 18)  SpO2: 99% (18 Oct 2017 08:00) (99% - 100%)  I&O's Summary      PHYSICAL EXAM:  General: No acute distress BMI-  HEENT: EOMI, PERRL[ ] Icteric  Neck: Supple, No JVD  Lungs: Equal air entry bilaterally; [ ] Rales [ ] Rhonchi [ ] Wheezing  Heart: Regular rate and rhythm;[x ] Murmurs-  2 /6 [x ] Systolic [ ] Diastolic [ ] Radiation,No rubs, or gallops  Abdomen: Nontender, bowel sounds present  Extremities: No clubbing, cyanosis, or edema[ ] Calf tenderness  Nervous system:  Alert & Oriented X3, no focal deficits  Psychiatric: Normal affect  Skin: No rashes or lesions      LABS:  10-18    140  |  107  |  13  ----------------------------<  86  3.7   |  27  |  0.76    Ca    9.3      18 Oct 2017 07:17  Phos  3.6     10-18  Mg     2.2     10-18    TPro  8.3  /  Alb  3.6  /  TBili  0.4  /  DBili  x   /  AST  25  /  ALT  41  /  AlkPhos  85  10-17    Creatinine Trend: 0.76<--, 0.85<--                        12.2   6.0   )-----------( 319      ( 18 Oct 2017 07:17 )             38.6     PT/INR - ( 18 Oct 2017 07:17 )   PT: 28.0 sec;   INR: 2.52 ratio    PTT - ( 17 Oct 2017 16:44 )  PTT:50.8 sec    Lipid Panel: Cholesterol, Serum 223  Direct   HDL Cholesterol, Serum 64  Triglycerides, Serum 79    Cardiac Enzymes: CARDIAC MARKERS ( 18 Oct 2017 07:17 )  <0.015 ng/mL / x     / 60 U/L / x     / <1.0 ng/mL  CARDIAC MARKERS ( 18 Oct 2017 00:31 )  <0.015 ng/mL / x     / 71 U/L / x     / <1.0 ng/mL  CARDIAC MARKERS ( 17 Oct 2017 16:44 )  <0.015 ng/mL / x     / 65 U/L / x     / <1.0 ng/mL      RADIOLOGY: CT ANGIO CHEST Impression: No evidence of acute pulmonary emboli.There is interval resolution of prior acute bilateral pulmonary emboli.     ECG [my interpretation]:Sinus rhythm Inferior infarct pattern,No acute ST Tabnormalities.    TELEMETRY:Sinus rhythm no arrhythmias    ECHO:  Study Date: 3/13/2017--Normal Left Ventricular Systolic Function,  (EF = 55%)Grade I diastolic dysfunction
History of Present Illness:    72 years old female from home lives with sisters, with PMH of Asthma (with nasal polyp, on Singulair), HTN (on Lisinopril for years), OA, and multiple spontaneous abortions (no kids, unable to conceive), and Massive b/l PE with LLE DVT(required ICU admission in 3/2017 at [facility], on warfarin),  presented to the ED c/o right sided aching pain, radiating to right shoulder, back and lung started last night. Last night she had high blood pressure-she does not remember how high it was, but it was around 170/125-, so she took extra dose of enalapril 10mg and somewhat achieved relief and fell asleep. This morning when she was watching TV, same pain came back with intensity of 5/10 so decided to seek medical help. Pain is on and off, and is different from the pain from PE that she experienced last time. Denies having SOB, palpitation, orthopnea, fever, chills, or any other symptoms.    In ED, patient was given 1 dose of 162mg aspirin, vital signs are stable, labs stable, currently patient is asymptomatic. CT angio was performed, showing resolving previous PE but no new PE. EKG NSR at 83 BPM, cardiac enzymes x 1 negative. Admitted to the telemetry floor for evaluation of chest pain. (17 Oct 2017 22:05)    She is c/o right side chest and right shoulder pain off and for last couple of days. Pain is localised to left shoulder and gets worse when she raises her right arm just above her shoulder. She did not c/o that she has any paresthesia or radiating pain from from her side of the neck. No focal weakness at all.      Allergies    No Known Allergies    Intolerances      PAST MEDICAL & SURGICAL HISTORY:  DVT (deep venous thrombosis)  Pulmonary embolism  Nasal polyp  Asthma  Arthritis  Seasonal allergies  History of hypertension  No significant past surgical history      Social History: [ ] Tobacco use, [ ] Alcohol use.        MEDICATIONS  (STANDING):  aspirin  chewable 81 milliGRAM(s) Oral daily  atorvastatin 40 milliGRAM(s) Oral at bedtime  enalapril 20 milliGRAM(s) Oral daily  fluticasone propionate 50 MICROgram(s)/spray Nasal Spray 1 Spray(s) Both Nostrils two times a day  influenza   Vaccine 0.5 milliLiter(s) IntraMuscular once  loratadine 10 milliGRAM(s) Oral daily  metoprolol 25 milliGRAM(s) Oral two times a day  montelukast 10 milliGRAM(s) Oral at bedtime  warfarin 6 milliGRAM(s) Oral once    Family:  FAMILY HISTORY:  Diabetes mellitus (Sibling)    Review of Systems:  General:  [ ] chills,[ ] fatigue,[ ] fevers  Skin: [ ] rash present  HEENT: [ ] head injury,[ ] blurred vision, [ ] double vision, [ ] eye pain, [ ] visual loss, [ ] hearing loss, [ ] deafness, [ ] ear pain, [ ] ringing in the ears, [ ] vertigo, [ ] sinus pain, [ ] voice changes  Neck: [ ] Neck stiffness  Respiratory: [ ] cough, [ ] difficulty breathing  Cardiovascular: [ ] calf cramps, [ x] chest pain, [ ] leg pain, [ ] swelling, [ ] rapid heart rate, [ ] shortness of breath  Gastrointestinal: [ ] abdominal pain, [ ] nausea, [ ] vomiting  Musculoskeletal: [ ] back pain, [ x] joint pain, [ ] joint stiffness, [ ] leg cramps, [ ] muscle atrophy, [ ] muscle cramps, [ ] muscle weakness, [ ] swelling of extremities  Neurological: [ ] Dizziness, [ ] decreased memory, [ ] fainting, [ ] focal neurological symptoms, [ ] headaches, [ ] incontinence of stool, [ ] incontinence of urine, [ ] loss of consciousness, [ ] numbness, [ ] seizures, [ ] spinning sensation, [ ] stroke, [ ] trouble walking, [ ] unsteadiness, [ ] visual changes,  [ ] weakness, [ ] tremors, [ ] rigidity, [ ] slowness  Psychiatric: [ ] depression, [ ] anxiety, [ ] hallucinations, [ ] inability to concentrate,[ ] mood changes, [ ] panic attacks  Hematology: [ ] blood clots, [ ] spontaneous bleeding        Vital Signs:    T(C): 36.4 (10-18-17 @ 15:44), Max: 37 (10-18-17 @ 01:34)  HR: 66 (10-18-17 @ 15:44) (54 - 77)  BP: 102/72 (10-18-17 @ 15:44) (102/60 - 158/81)  RR: 18 (10-18-17 @ 15:44) (16 - 18)  SpO2: 99% (10-18-17 @ 15:44) (98% - 100%)      Physical Exam:    General:  General Appearance - Well groomed, Not sickly   Build and nutrition - Well nourished and Well developed  Posture - Normal posture    Head and Neck:  Head - normocephalic, atraumatic with no lesions or palpable masses    Chest and Lung exam:  Quiet, even and easy respiratory effort with no use of accessory muscles and on ausculation, normal breath sounds, no adventitious sounds and normal vocal resonance    Cardiovascular:  Auscultation: Normal heart sounds  Murmurs & Other heart sounds: Auscultation of the heart reveals- no murmurs  Carotid arteris: No Carotid bruits    Abdomen:  Palpation/Percussion: Non Tender, No Rebound tenderness, No hepatosplenomegaly, and No palpable abdominal masses    Peripheral Vascular:  Lower extremity: Inspection - Bilateral - No varicose veins  Palpation: Tenderness - bilateral - Non tender  Dorsalis pedis pulse - bilateral - normal  Edema - bilateral - no edema    Neurologic:  Mental Status -  Alert  Fund of Knowledge – Normal  Affect- appropriate  Recent Memory – Normal  Remote Memory – Normal  Attention Span – Normal  Concentration –  Normal  Cognitive function – Normal  Speech – Normal  Thought content/perception – Normal    Cranial Nerves:  II Optic: Visual acuity – Bilateral - Normal ; Visual fields – normal ; Fundi – Bilateral – no optic atrophy or Papilledema  III Oculomotor – Normal bilaterally  IV Trochlear – Bilateral – Normal  V Trigeminal: Ophthalmic – Bilateral – Normal;  Maxillary – Bilateral- Normal; Mandibular – Bilateral - Normal  VI Abducens – Bilateral - Normal  VII Facial: Normal bilaterally  VIII Acoustic - Bilateral – hearing normal and (hearing tested by finger rub)  IX Glossopharyngeal / X Vagus: Uvula – Normal  XI Accessory: Normal Shoulder Shrug  XII Hypoglossal – Bilaterally Normal  Eye Movements: Gaze – Bilateral - Normal    Nystagmus – Bilateral – None  Motor:  Bulk and Contour: Normal  Tone: Normal  Strength:                                                             Delt              Bicep           Tricep                                 Upper Extremities:   Right              5/5               5/5               5/5                5/5                                                          Left                5/5               5/5               5/5                5/5                                                                                  HF                 KE                KF                   DF                     Lower Extremities:   Right             5/5               5/5              5/5                  5/5                                                          Left               5/5               5/5              5/5                  5/5  General Assessment of Reflexes: Right Wrist - 2+ ;  Left Wrist - 2+ ; Right Elbow - 2+ ;  Left Elbow - 2+ ;  Right Knee - 2+ ;  Left Knee - 2+ ;   Right Ankle – 2+ ; Left Ankle – 2+  Plantar Reflexes(Babinski) (L4-S2) – Bilateral – Flexion  Sensory:  Light Touch: Intact – Globally  Pain: Intact – Globally  Temperature: Intact – Globally  Coordination – No Impairment of heel-to-shin, Impairment of finger-to-nose or Impairment of rapid alternating movement  Gait – Normal tandem walking, Normal heel walking and Normal toes walking  Romberg’s sign: - Normal

## 2017-10-18 NOTE — PROGRESS NOTE ADULT - PROBLEM SELECTOR PLAN 1
-No new PE in CT angio, patient is fully anticoagulated with therapeutic level of warfarin.   -EKG NSR at 83BPM, no STT change  -Troponin X3 neg,   -telemetry no events   -JUAN score 1  -Starting ACS protocol with aspirin, lipitor, and metoprolol, can discontinue if ACS is ruled out  -TTE in 3/2017: GI DD, normal EF, RV dysfunction. Would not repeat at this time  -Cardio Dr. Singletary  - no further cardiac workup needed   - Dr Rapp neurology consulted since patient has neck pain , headaches and came High blood pressure, patient is on coumadin

## 2017-10-18 NOTE — CONSULT NOTE ADULT - ASSESSMENT
Patient is a 72y old  Female who presents with a chief complaint of right sided chest pain, is admitted to the telemetry floor for evaluation of chest pain.                        Problem/Plan - 1:  ·  Problem: Chest pain.  Plan: -No new PE in CT angio, patient is fully anticoagulated with therapeutic level of warfarin.   -EKG NSR at 83BPM, no STT change,no evidence of cardiac injury,atypical for angina-NO FURTHER CARDIAC WORK_UP    Problem/Plan - 2:  ·  Problem: Pulmonary embolism.  Plan: -On Warfarin with therapeutic INR, CT evidence of resolving clots  -Will continue warfarin 6mg daily. Will give 5mg today.     Problem/Plan - 3:  ·  Problem: DVT (deep venous thrombosis).  Plan: -On Warfarin with therapeutic INR  -No evidence of new acute DVT on physical exam  -Will continue warfarin 6mg daily. Will give 5mg today.     Problem/Plan - 4:  ·  Problem: History of hypertension.  Plan: -Continue Enalapril at home dose  -Will start metoprolol with parameter for ACS protocol  -monitor BP.     Problem/Plan - 5:  ·  Problem: Need for prophylactic measure.  Plan: -IMPROVE score 5, patient will be fully anticoagulated with warfarin and INR is therapeutic.
1) Right shoulder pain

## 2017-10-18 NOTE — DISCHARGE NOTE ADULT - PLAN OF CARE
To treat the underlying condition likely musculoskeletal pain, follow up with primary care in 2 weeks To prevent further attacks continue with coumadin 6 mg home dose, Goal INR 2 - 3. follow up Pt/inr with primary care in 2 weeks TO treat and prevent DVT continue with coumadin TO treat the underlying condition continue with home medications TO Keep SBP < 140 continue with home medication and follow up with primary care in 2 weeks

## 2017-10-18 NOTE — DISCHARGE NOTE ADULT - CARE PLAN
Principal Discharge DX:	Chest pain  Goal:	To treat the underlying condition  Instructions for follow-up, activity and diet:	likely musculoskeletal pain, follow up with primary care in 2 weeks  Secondary Diagnosis:	Pulmonary embolism  Goal:	To prevent further attacks  Instructions for follow-up, activity and diet:	continue with coumadin 6 mg home dose, Goal INR 2 - 3. follow up Pt/inr with primary care in 2 weeks  Secondary Diagnosis:	DVT (deep venous thrombosis)  Goal:	TO treat and prevent DVT  Instructions for follow-up, activity and diet:	continue with coumadin  Secondary Diagnosis:	Asthma  Goal:	TO treat the underlying condition  Instructions for follow-up, activity and diet:	continue with home medications  Secondary Diagnosis:	History of hypertension  Goal:	TO Keep SBP < 140  Instructions for follow-up, activity and diet:	continue with home medication and follow up with primary care in 2 weeks

## 2017-10-20 DIAGNOSIS — R07.9 CHEST PAIN, UNSPECIFIED: ICD-10-CM

## 2017-10-20 DIAGNOSIS — I26.99 OTHER PULMONARY EMBOLISM WITHOUT ACUTE COR PULMONALE: ICD-10-CM

## 2017-10-20 DIAGNOSIS — I82.402 ACUTE EMBOLISM AND THROMBOSIS OF UNSPECIFIED DEEP VEINS OF LEFT LOWER EXTREMITY: ICD-10-CM

## 2017-10-20 DIAGNOSIS — J33.9 NASAL POLYP, UNSPECIFIED: ICD-10-CM

## 2017-10-20 DIAGNOSIS — J45.909 UNSPECIFIED ASTHMA, UNCOMPLICATED: ICD-10-CM

## 2017-10-20 DIAGNOSIS — J30.2 OTHER SEASONAL ALLERGIC RHINITIS: ICD-10-CM

## 2017-10-20 DIAGNOSIS — M19.90 UNSPECIFIED OSTEOARTHRITIS, UNSPECIFIED SITE: ICD-10-CM

## 2017-10-20 DIAGNOSIS — I10 ESSENTIAL (PRIMARY) HYPERTENSION: ICD-10-CM

## 2018-01-29 ENCOUNTER — EMERGENCY (EMERGENCY)
Facility: HOSPITAL | Age: 73
LOS: 1 days | Discharge: HOME | End: 2018-01-29
Attending: EMERGENCY MEDICINE
Payer: MEDICARE

## 2018-01-29 VITALS
TEMPERATURE: 97 F | SYSTOLIC BLOOD PRESSURE: 142 MMHG | DIASTOLIC BLOOD PRESSURE: 88 MMHG | RESPIRATION RATE: 20 BRPM | HEART RATE: 80 BPM | OXYGEN SATURATION: 97 %

## 2018-01-29 LAB
ALBUMIN SERPL ELPH-MCNC: 3.9 G/DL — SIGNIFICANT CHANGE UP (ref 3.5–5)
ALP SERPL-CCNC: 85 U/L — SIGNIFICANT CHANGE UP (ref 40–120)
ALT FLD-CCNC: 30 U/L DA — SIGNIFICANT CHANGE UP (ref 10–60)
ANION GAP SERPL CALC-SCNC: 7 MMOL/L — SIGNIFICANT CHANGE UP (ref 5–17)
APTT BLD: 44.9 SEC — HIGH (ref 27.5–37.4)
AST SERPL-CCNC: 22 U/L — SIGNIFICANT CHANGE UP (ref 10–40)
BASOPHILS # BLD AUTO: 0.1 K/UL — SIGNIFICANT CHANGE UP (ref 0–0.2)
BASOPHILS NFR BLD AUTO: 1.1 % — SIGNIFICANT CHANGE UP (ref 0–2)
BILIRUB SERPL-MCNC: 0.5 MG/DL — SIGNIFICANT CHANGE UP (ref 0.2–1.2)
BUN SERPL-MCNC: 11 MG/DL — SIGNIFICANT CHANGE UP (ref 7–18)
CALCIUM SERPL-MCNC: 9.3 MG/DL — SIGNIFICANT CHANGE UP (ref 8.4–10.5)
CHLORIDE SERPL-SCNC: 105 MMOL/L — SIGNIFICANT CHANGE UP (ref 96–108)
CO2 SERPL-SCNC: 27 MMOL/L — SIGNIFICANT CHANGE UP (ref 22–31)
CREAT SERPL-MCNC: 0.79 MG/DL — SIGNIFICANT CHANGE UP (ref 0.5–1.3)
EOSINOPHIL # BLD AUTO: 0.2 K/UL — SIGNIFICANT CHANGE UP (ref 0–0.5)
EOSINOPHIL NFR BLD AUTO: 2.2 % — SIGNIFICANT CHANGE UP (ref 0–6)
GLUCOSE SERPL-MCNC: 91 MG/DL — SIGNIFICANT CHANGE UP (ref 70–99)
HCT VFR BLD CALC: 41.1 % — SIGNIFICANT CHANGE UP (ref 34.5–45)
HGB BLD-MCNC: 12.8 G/DL — SIGNIFICANT CHANGE UP (ref 11.5–15.5)
INR BLD: 1.95 RATIO — HIGH (ref 0.88–1.16)
LYMPHOCYTES # BLD AUTO: 2 K/UL — SIGNIFICANT CHANGE UP (ref 1–3.3)
LYMPHOCYTES # BLD AUTO: 23.8 % — SIGNIFICANT CHANGE UP (ref 13–44)
MCHC RBC-ENTMCNC: 26.5 PG — LOW (ref 27–34)
MCHC RBC-ENTMCNC: 31.1 GM/DL — LOW (ref 32–36)
MCV RBC AUTO: 85 FL — SIGNIFICANT CHANGE UP (ref 80–100)
MONOCYTES # BLD AUTO: 0.4 K/UL — SIGNIFICANT CHANGE UP (ref 0–0.9)
MONOCYTES NFR BLD AUTO: 4.7 % — SIGNIFICANT CHANGE UP (ref 2–14)
NEUTROPHILS # BLD AUTO: 5.7 K/UL — SIGNIFICANT CHANGE UP (ref 1.8–7.4)
NEUTROPHILS NFR BLD AUTO: 68.2 % — SIGNIFICANT CHANGE UP (ref 43–77)
PLATELET # BLD AUTO: 329 K/UL — SIGNIFICANT CHANGE UP (ref 150–400)
POTASSIUM SERPL-MCNC: 4.2 MMOL/L — SIGNIFICANT CHANGE UP (ref 3.5–5.3)
POTASSIUM SERPL-SCNC: 4.2 MMOL/L — SIGNIFICANT CHANGE UP (ref 3.5–5.3)
PROT SERPL-MCNC: 8.4 G/DL — HIGH (ref 6–8.3)
PROTHROM AB SERPL-ACNC: 21.5 SEC — HIGH (ref 9.8–12.7)
RBC # BLD: 4.84 M/UL — SIGNIFICANT CHANGE UP (ref 3.8–5.2)
RBC # FLD: 12.8 % — SIGNIFICANT CHANGE UP (ref 10.3–14.5)
SODIUM SERPL-SCNC: 139 MMOL/L — SIGNIFICANT CHANGE UP (ref 135–145)
WBC # BLD: 8.4 K/UL — SIGNIFICANT CHANGE UP (ref 3.8–10.5)
WBC # FLD AUTO: 8.4 K/UL — SIGNIFICANT CHANGE UP (ref 3.8–10.5)

## 2018-01-29 PROCEDURE — 85610 PROTHROMBIN TIME: CPT

## 2018-01-29 PROCEDURE — 85027 COMPLETE CBC AUTOMATED: CPT

## 2018-01-29 PROCEDURE — 99284 EMERGENCY DEPT VISIT MOD MDM: CPT

## 2018-01-29 PROCEDURE — 73552 X-RAY EXAM OF FEMUR 2/>: CPT

## 2018-01-29 PROCEDURE — 99284 EMERGENCY DEPT VISIT MOD MDM: CPT | Mod: 25

## 2018-01-29 PROCEDURE — 85730 THROMBOPLASTIN TIME PARTIAL: CPT

## 2018-01-29 PROCEDURE — 80053 COMPREHEN METABOLIC PANEL: CPT

## 2018-01-29 PROCEDURE — 73552 X-RAY EXAM OF FEMUR 2/>: CPT | Mod: 26,LT

## 2018-01-29 PROCEDURE — 93971 EXTREMITY STUDY: CPT | Mod: 26,LT

## 2018-01-29 PROCEDURE — 93971 EXTREMITY STUDY: CPT

## 2018-01-29 NOTE — ED PROVIDER NOTE - PROGRESS NOTE DETAILS
EMPERATRIZ: pt ambulating unassisted around the unit, discussed and gave copies of negative work up. Dc home to PMD.

## 2018-01-29 NOTE — ED PROVIDER NOTE - ATTENDING CONTRIBUTION TO CARE
pt d/c by np. I eval'ed pt only on initial arrival and was available for general supervision throughout stay.

## 2018-01-29 NOTE — ED PROVIDER NOTE - OBJECTIVE STATEMENT
73 y/o F pt w/ PMHx of PE (on Coumadin) presents to ED c/o L leg pain x yesterday. Pt reports pain at L calf and L thigh, which is worse. Pt reports that she had a PE in March, which started in the L calf. Pt denies any trauma, redness, or any other complaints. 73 y/o F pt w/ PMHx of PE (on Coumadin) presents to ED c/o L leg pain x yesterday. Pt reports pain at L calf and L thigh, which is worse. Pt reports that she had a PE in March, which started in the L calf. Pt denies any trauma, redness, or any other complaints. was told to go to er by md  to destini dvt

## 2018-08-18 ENCOUNTER — EMERGENCY (EMERGENCY)
Facility: HOSPITAL | Age: 73
LOS: 1 days | Discharge: HOME | End: 2018-08-18
Attending: EMERGENCY MEDICINE
Payer: MEDICARE

## 2018-08-18 VITALS
OXYGEN SATURATION: 98 % | DIASTOLIC BLOOD PRESSURE: 87 MMHG | TEMPERATURE: 98 F | HEART RATE: 78 BPM | RESPIRATION RATE: 16 BRPM | SYSTOLIC BLOOD PRESSURE: 127 MMHG

## 2018-08-18 VITALS
OXYGEN SATURATION: 100 % | RESPIRATION RATE: 16 BRPM | HEART RATE: 72 BPM | HEIGHT: 66 IN | SYSTOLIC BLOOD PRESSURE: 121 MMHG | DIASTOLIC BLOOD PRESSURE: 75 MMHG | TEMPERATURE: 98 F | WEIGHT: 162.04 LBS

## 2018-08-18 PROBLEM — I82.409 ACUTE EMBOLISM AND THROMBOSIS OF UNSPECIFIED DEEP VEINS OF UNSPECIFIED LOWER EXTREMITY: Chronic | Status: ACTIVE | Noted: 2017-10-17

## 2018-08-18 PROBLEM — I26.99 OTHER PULMONARY EMBOLISM WITHOUT ACUTE COR PULMONALE: Chronic | Status: ACTIVE | Noted: 2017-10-17

## 2018-08-18 LAB
ALBUMIN SERPL ELPH-MCNC: 3.5 G/DL — SIGNIFICANT CHANGE UP (ref 3.5–5)
ALP SERPL-CCNC: 90 U/L — SIGNIFICANT CHANGE UP (ref 40–120)
ALT FLD-CCNC: 30 U/L DA — SIGNIFICANT CHANGE UP (ref 10–60)
ANION GAP SERPL CALC-SCNC: 11 MMOL/L — SIGNIFICANT CHANGE UP (ref 5–17)
APTT BLD: 43.9 SEC — HIGH (ref 27.5–37.4)
AST SERPL-CCNC: 21 U/L — SIGNIFICANT CHANGE UP (ref 10–40)
BASOPHILS # BLD AUTO: 0.1 K/UL — SIGNIFICANT CHANGE UP (ref 0–0.2)
BASOPHILS NFR BLD AUTO: 1.2 % — SIGNIFICANT CHANGE UP (ref 0–2)
BILIRUB SERPL-MCNC: 0.9 MG/DL — SIGNIFICANT CHANGE UP (ref 0.2–1.2)
BUN SERPL-MCNC: 17 MG/DL — SIGNIFICANT CHANGE UP (ref 7–18)
CALCIUM SERPL-MCNC: 9.5 MG/DL — SIGNIFICANT CHANGE UP (ref 8.4–10.5)
CHLORIDE SERPL-SCNC: 105 MMOL/L — SIGNIFICANT CHANGE UP (ref 96–108)
CO2 SERPL-SCNC: 22 MMOL/L — SIGNIFICANT CHANGE UP (ref 22–31)
CREAT SERPL-MCNC: 1.01 MG/DL — SIGNIFICANT CHANGE UP (ref 0.5–1.3)
EOSINOPHIL # BLD AUTO: 0.3 K/UL — SIGNIFICANT CHANGE UP (ref 0–0.5)
EOSINOPHIL NFR BLD AUTO: 3.4 % — SIGNIFICANT CHANGE UP (ref 0–6)
GLUCOSE SERPL-MCNC: 117 MG/DL — HIGH (ref 70–99)
HCT VFR BLD CALC: 36.1 % — SIGNIFICANT CHANGE UP (ref 34.5–45)
HGB BLD-MCNC: 11.6 G/DL — SIGNIFICANT CHANGE UP (ref 11.5–15.5)
INR BLD: 2.18 RATIO — HIGH (ref 0.88–1.16)
LYMPHOCYTES # BLD AUTO: 1.9 K/UL — SIGNIFICANT CHANGE UP (ref 1–3.3)
LYMPHOCYTES # BLD AUTO: 22.3 % — SIGNIFICANT CHANGE UP (ref 13–44)
MCHC RBC-ENTMCNC: 26.6 PG — LOW (ref 27–34)
MCHC RBC-ENTMCNC: 32.2 GM/DL — SIGNIFICANT CHANGE UP (ref 32–36)
MCV RBC AUTO: 82.7 FL — SIGNIFICANT CHANGE UP (ref 80–100)
MONOCYTES # BLD AUTO: 0.5 K/UL — SIGNIFICANT CHANGE UP (ref 0–0.9)
MONOCYTES NFR BLD AUTO: 6.3 % — SIGNIFICANT CHANGE UP (ref 2–14)
NEUTROPHILS # BLD AUTO: 5.7 K/UL — SIGNIFICANT CHANGE UP (ref 1.8–7.4)
NEUTROPHILS NFR BLD AUTO: 66.8 % — SIGNIFICANT CHANGE UP (ref 43–77)
PLATELET # BLD AUTO: 336 K/UL — SIGNIFICANT CHANGE UP (ref 150–400)
POTASSIUM SERPL-MCNC: 3.5 MMOL/L — SIGNIFICANT CHANGE UP (ref 3.5–5.3)
POTASSIUM SERPL-SCNC: 3.5 MMOL/L — SIGNIFICANT CHANGE UP (ref 3.5–5.3)
PROT SERPL-MCNC: 8.3 G/DL — SIGNIFICANT CHANGE UP (ref 6–8.3)
PROTHROM AB SERPL-ACNC: 24.1 SEC — HIGH (ref 9.8–12.7)
RBC # BLD: 4.37 M/UL — SIGNIFICANT CHANGE UP (ref 3.8–5.2)
RBC # FLD: 13.6 % — SIGNIFICANT CHANGE UP (ref 10.3–14.5)
SODIUM SERPL-SCNC: 138 MMOL/L — SIGNIFICANT CHANGE UP (ref 135–145)
WBC # BLD: 8.6 K/UL — SIGNIFICANT CHANGE UP (ref 3.8–10.5)
WBC # FLD AUTO: 8.6 K/UL — SIGNIFICANT CHANGE UP (ref 3.8–10.5)

## 2018-08-18 PROCEDURE — 73090 X-RAY EXAM OF FOREARM: CPT

## 2018-08-18 PROCEDURE — 85027 COMPLETE CBC AUTOMATED: CPT

## 2018-08-18 PROCEDURE — 72125 CT NECK SPINE W/O DYE: CPT | Mod: 26

## 2018-08-18 PROCEDURE — 72125 CT NECK SPINE W/O DYE: CPT

## 2018-08-18 PROCEDURE — 86901 BLOOD TYPING SEROLOGIC RH(D): CPT

## 2018-08-18 PROCEDURE — 86900 BLOOD TYPING SEROLOGIC ABO: CPT

## 2018-08-18 PROCEDURE — 72170 X-RAY EXAM OF PELVIS: CPT | Mod: 26

## 2018-08-18 PROCEDURE — 99284 EMERGENCY DEPT VISIT MOD MDM: CPT

## 2018-08-18 PROCEDURE — 99284 EMERGENCY DEPT VISIT MOD MDM: CPT | Mod: 25

## 2018-08-18 PROCEDURE — 72170 X-RAY EXAM OF PELVIS: CPT

## 2018-08-18 PROCEDURE — 71046 X-RAY EXAM CHEST 2 VIEWS: CPT | Mod: 26

## 2018-08-18 PROCEDURE — 73110 X-RAY EXAM OF WRIST: CPT | Mod: 26,LT

## 2018-08-18 PROCEDURE — 86850 RBC ANTIBODY SCREEN: CPT

## 2018-08-18 PROCEDURE — 80053 COMPREHEN METABOLIC PANEL: CPT

## 2018-08-18 PROCEDURE — 73110 X-RAY EXAM OF WRIST: CPT

## 2018-08-18 PROCEDURE — 70450 CT HEAD/BRAIN W/O DYE: CPT | Mod: 26

## 2018-08-18 PROCEDURE — 85730 THROMBOPLASTIN TIME PARTIAL: CPT

## 2018-08-18 PROCEDURE — 70450 CT HEAD/BRAIN W/O DYE: CPT

## 2018-08-18 PROCEDURE — 71046 X-RAY EXAM CHEST 2 VIEWS: CPT

## 2018-08-18 PROCEDURE — 85610 PROTHROMBIN TIME: CPT

## 2018-08-18 PROCEDURE — 73090 X-RAY EXAM OF FOREARM: CPT | Mod: 26,LT

## 2018-08-18 RX ORDER — ACETAMINOPHEN 500 MG
650 TABLET ORAL ONCE
Qty: 0 | Refills: 0 | Status: COMPLETED | OUTPATIENT
Start: 2018-08-18 | End: 2018-08-18

## 2018-08-18 RX ADMIN — Medication 650 MILLIGRAM(S): at 12:58

## 2018-08-18 NOTE — ED PROVIDER NOTE - PHYSICAL EXAMINATION
GENERAL: wells appearing, no acute distress   HEAD: atraumatic   EYES: EOMI, pink conjunctiva   ENT: moist oral mucosa   CARDIAC: RRR, no edema, distal pulses present   RESPIRATORY: lungs CTAB, no increased work of breathing   GASTROINTESTINAL: no abdominal tenderness, no rebound or guarding, bowel sounds presents  GENITOURINARY: no CVA tenderness   MUSCULOSKELETAL: +ttp of dorsal aspect of wrist; no deformity; sensation instact; compartments soft; full ROM but mildly painful; good cap refill   NEUROLOGICAL: AAOx3, CN's II-XII intact, strength 5/5 bilateral UE and LE, sensation intact to light touch, finger to nose intact, steady gait  SKIN: intact   PSYCHIATRIC: cooperative  HEME LYMPH: no lymphadenopathy

## 2018-08-18 NOTE — ED PROVIDER NOTE - OBJECTIVE STATEMENT
72 yo F pmh of DVT, PE (on coumadin), and HTN presents with L wrist pain s/p mechanical slip and fall from standing 1 day ago. Denies head trauma, HA, LOC, neck or back pain, chest pain, abd pain, sob, palpitations, dizziness, syncope, other acute complaints.

## 2018-08-18 NOTE — ED PROVIDER NOTE - PROGRESS NOTE DETAILS
INR therapeutic  Imaging negative for traumatic injuries. Repeat neuro exam wnl.   Will d/c with PCP f/u. Discussed indications for patient return to ED. Patient understood.

## 2018-08-18 NOTE — ED PROVIDER NOTE - MEDICAL DECISION MAKING DETAILS
72 yo F on coumadin presents with L wrist pain s/p mechanical slip and fall    Plan - CT head given AC; xrays, pain control, check INR (last check 3 weeks ago), observe and reassess

## 2018-08-18 NOTE — ED PROVIDER NOTE - NS ED ROS FT
CONSTITUTIONAL: no fever, no chills   EYES: no visual changes, no eye pain   ENMT: no nasal congestion, no throat pain  CARDIOVASCULAR: no chest pain, no edema, no palpitations   RESPIRATORY: no shortness of breath, no cough   GASTROINTESTINAL: no abdominal pain, no nausea, no vomiting, no diarrhea, no constipation   GENITOURINARY: no dysuria, no frequency  MUSCULOSKELETAL: +joint pain, no myalgias, no back pain   SKIN: no rashes  NEUROLOGICAL: no weakness, no headache, no dizziness, no slurred speech, no syncope   PSYCHIATRIC: no known mental health illness   HEME/LYMPH: no lymphadenopathy      All other ROS negative except as per HPI

## 2018-08-18 NOTE — ED ADULT TRIAGE NOTE - CHIEF COMPLAINT QUOTE
C/o fall with left wrist pain, right buttock pain and right knee pain. Pt takes warfarin, denies hitting head.

## 2019-10-29 ENCOUNTER — INPATIENT (INPATIENT)
Facility: HOSPITAL | Age: 74
LOS: 1 days | Discharge: HOME | DRG: 378 | End: 2019-10-31
Attending: STUDENT IN AN ORGANIZED HEALTH CARE EDUCATION/TRAINING PROGRAM | Admitting: STUDENT IN AN ORGANIZED HEALTH CARE EDUCATION/TRAINING PROGRAM
Payer: MEDICARE

## 2019-10-29 VITALS
TEMPERATURE: 98 F | WEIGHT: 175.93 LBS | HEART RATE: 80 BPM | HEIGHT: 66 IN | RESPIRATION RATE: 16 BRPM | OXYGEN SATURATION: 98 % | DIASTOLIC BLOOD PRESSURE: 103 MMHG | SYSTOLIC BLOOD PRESSURE: 170 MMHG

## 2019-10-29 DIAGNOSIS — Z29.9 ENCOUNTER FOR PROPHYLACTIC MEASURES, UNSPECIFIED: ICD-10-CM

## 2019-10-29 DIAGNOSIS — I26.99 OTHER PULMONARY EMBOLISM WITHOUT ACUTE COR PULMONALE: ICD-10-CM

## 2019-10-29 DIAGNOSIS — I82.409 ACUTE EMBOLISM AND THROMBOSIS OF UNSPECIFIED DEEP VEINS OF UNSPECIFIED LOWER EXTREMITY: ICD-10-CM

## 2019-10-29 DIAGNOSIS — K92.2 GASTROINTESTINAL HEMORRHAGE, UNSPECIFIED: ICD-10-CM

## 2019-10-29 DIAGNOSIS — J45.909 UNSPECIFIED ASTHMA, UNCOMPLICATED: ICD-10-CM

## 2019-10-29 DIAGNOSIS — K62.5 HEMORRHAGE OF ANUS AND RECTUM: ICD-10-CM

## 2019-10-29 DIAGNOSIS — Z86.79 PERSONAL HISTORY OF OTHER DISEASES OF THE CIRCULATORY SYSTEM: ICD-10-CM

## 2019-10-29 LAB
ALBUMIN SERPL ELPH-MCNC: 3.4 G/DL — LOW (ref 3.5–5)
ALBUMIN SERPL ELPH-MCNC: 3.4 G/DL — LOW (ref 3.5–5)
ALP SERPL-CCNC: 77 U/L — SIGNIFICANT CHANGE UP (ref 40–120)
ALP SERPL-CCNC: 80 U/L — SIGNIFICANT CHANGE UP (ref 40–120)
ALT FLD-CCNC: 30 U/L DA — SIGNIFICANT CHANGE UP (ref 10–60)
ALT FLD-CCNC: 30 U/L DA — SIGNIFICANT CHANGE UP (ref 10–60)
ANION GAP SERPL CALC-SCNC: 3 MMOL/L — LOW (ref 5–17)
ANION GAP SERPL CALC-SCNC: 5 MMOL/L — SIGNIFICANT CHANGE UP (ref 5–17)
APPEARANCE UR: CLEAR — SIGNIFICANT CHANGE UP
APTT BLD: 39.7 SEC — HIGH (ref 27.5–36.3)
AST SERPL-CCNC: 23 U/L — SIGNIFICANT CHANGE UP (ref 10–40)
AST SERPL-CCNC: 23 U/L — SIGNIFICANT CHANGE UP (ref 10–40)
BASOPHILS # BLD AUTO: 0.06 K/UL — SIGNIFICANT CHANGE UP (ref 0–0.2)
BASOPHILS # BLD AUTO: 0.06 K/UL — SIGNIFICANT CHANGE UP (ref 0–0.2)
BASOPHILS NFR BLD AUTO: 0.8 % — SIGNIFICANT CHANGE UP (ref 0–2)
BASOPHILS NFR BLD AUTO: 0.8 % — SIGNIFICANT CHANGE UP (ref 0–2)
BILIRUB SERPL-MCNC: 0.4 MG/DL — SIGNIFICANT CHANGE UP (ref 0.2–1.2)
BILIRUB SERPL-MCNC: 0.5 MG/DL — SIGNIFICANT CHANGE UP (ref 0.2–1.2)
BILIRUB UR-MCNC: NEGATIVE — SIGNIFICANT CHANGE UP
BUN SERPL-MCNC: 16 MG/DL — SIGNIFICANT CHANGE UP (ref 7–18)
BUN SERPL-MCNC: 21 MG/DL — HIGH (ref 7–18)
CALCIUM SERPL-MCNC: 9.1 MG/DL — SIGNIFICANT CHANGE UP (ref 8.4–10.5)
CALCIUM SERPL-MCNC: 9.4 MG/DL — SIGNIFICANT CHANGE UP (ref 8.4–10.5)
CHLORIDE SERPL-SCNC: 107 MMOL/L — SIGNIFICANT CHANGE UP (ref 96–108)
CHLORIDE SERPL-SCNC: 107 MMOL/L — SIGNIFICANT CHANGE UP (ref 96–108)
CHOLEST SERPL-MCNC: 126 MG/DL — SIGNIFICANT CHANGE UP (ref 10–199)
CO2 SERPL-SCNC: 27 MMOL/L — SIGNIFICANT CHANGE UP (ref 22–31)
CO2 SERPL-SCNC: 29 MMOL/L — SIGNIFICANT CHANGE UP (ref 22–31)
COLOR SPEC: YELLOW — SIGNIFICANT CHANGE UP
CREAT SERPL-MCNC: 0.78 MG/DL — SIGNIFICANT CHANGE UP (ref 0.5–1.3)
CREAT SERPL-MCNC: 0.92 MG/DL — SIGNIFICANT CHANGE UP (ref 0.5–1.3)
DIFF PNL FLD: ABNORMAL
EOSINOPHIL # BLD AUTO: 0.23 K/UL — SIGNIFICANT CHANGE UP (ref 0–0.5)
EOSINOPHIL # BLD AUTO: 0.54 K/UL — HIGH (ref 0–0.5)
EOSINOPHIL NFR BLD AUTO: 3 % — SIGNIFICANT CHANGE UP (ref 0–6)
EOSINOPHIL NFR BLD AUTO: 7.3 % — HIGH (ref 0–6)
GLUCOSE SERPL-MCNC: 104 MG/DL — HIGH (ref 70–99)
GLUCOSE SERPL-MCNC: 97 MG/DL — SIGNIFICANT CHANGE UP (ref 70–99)
GLUCOSE UR QL: NEGATIVE — SIGNIFICANT CHANGE UP
HBA1C BLD-MCNC: 5.7 % — HIGH (ref 4–5.6)
HCT VFR BLD CALC: 34.9 % — SIGNIFICANT CHANGE UP (ref 34.5–45)
HCT VFR BLD CALC: 35.9 % — SIGNIFICANT CHANGE UP (ref 34.5–45)
HDLC SERPL-MCNC: 61 MG/DL — SIGNIFICANT CHANGE UP
HGB BLD-MCNC: 11.2 G/DL — LOW (ref 11.5–15.5)
HGB BLD-MCNC: 11.7 G/DL — SIGNIFICANT CHANGE UP (ref 11.5–15.5)
IMM GRANULOCYTES NFR BLD AUTO: 0.1 % — SIGNIFICANT CHANGE UP (ref 0–1.5)
IMM GRANULOCYTES NFR BLD AUTO: 0.1 % — SIGNIFICANT CHANGE UP (ref 0–1.5)
INR BLD: 2.08 RATIO — HIGH (ref 0.88–1.16)
KETONES UR-MCNC: NEGATIVE — SIGNIFICANT CHANGE UP
LACTATE SERPL-SCNC: 0.8 MMOL/L — SIGNIFICANT CHANGE UP (ref 0.7–2)
LEUKOCYTE ESTERASE UR-ACNC: NEGATIVE — SIGNIFICANT CHANGE UP
LIDOCAIN IGE QN: 170 U/L — SIGNIFICANT CHANGE UP (ref 73–393)
LIPID PNL WITH DIRECT LDL SERPL: 50 MG/DL — SIGNIFICANT CHANGE UP
LYMPHOCYTES # BLD AUTO: 1.77 K/UL — SIGNIFICANT CHANGE UP (ref 1–3.3)
LYMPHOCYTES # BLD AUTO: 1.88 K/UL — SIGNIFICANT CHANGE UP (ref 1–3.3)
LYMPHOCYTES # BLD AUTO: 23.8 % — SIGNIFICANT CHANGE UP (ref 13–44)
LYMPHOCYTES # BLD AUTO: 24.9 % — SIGNIFICANT CHANGE UP (ref 13–44)
MAGNESIUM SERPL-MCNC: 2.2 MG/DL — SIGNIFICANT CHANGE UP (ref 1.6–2.6)
MCHC RBC-ENTMCNC: 26.6 PG — LOW (ref 27–34)
MCHC RBC-ENTMCNC: 26.6 PG — LOW (ref 27–34)
MCHC RBC-ENTMCNC: 32.1 GM/DL — SIGNIFICANT CHANGE UP (ref 32–36)
MCHC RBC-ENTMCNC: 32.6 GM/DL — SIGNIFICANT CHANGE UP (ref 32–36)
MCV RBC AUTO: 81.6 FL — SIGNIFICANT CHANGE UP (ref 80–100)
MCV RBC AUTO: 82.9 FL — SIGNIFICANT CHANGE UP (ref 80–100)
MONOCYTES # BLD AUTO: 0.37 K/UL — SIGNIFICANT CHANGE UP (ref 0–0.9)
MONOCYTES # BLD AUTO: 0.54 K/UL — SIGNIFICANT CHANGE UP (ref 0–0.9)
MONOCYTES NFR BLD AUTO: 4.9 % — SIGNIFICANT CHANGE UP (ref 2–14)
MONOCYTES NFR BLD AUTO: 7.3 % — SIGNIFICANT CHANGE UP (ref 2–14)
NEUTROPHILS # BLD AUTO: 4.52 K/UL — SIGNIFICANT CHANGE UP (ref 1.8–7.4)
NEUTROPHILS # BLD AUTO: 5 K/UL — SIGNIFICANT CHANGE UP (ref 1.8–7.4)
NEUTROPHILS NFR BLD AUTO: 60.7 % — SIGNIFICANT CHANGE UP (ref 43–77)
NEUTROPHILS NFR BLD AUTO: 66.3 % — SIGNIFICANT CHANGE UP (ref 43–77)
NITRITE UR-MCNC: NEGATIVE — SIGNIFICANT CHANGE UP
NRBC # BLD: 0 /100 WBCS — SIGNIFICANT CHANGE UP (ref 0–0)
NRBC # BLD: 0 /100 WBCS — SIGNIFICANT CHANGE UP (ref 0–0)
OB PNL STL: POSITIVE
PH UR: 6 — SIGNIFICANT CHANGE UP (ref 5–8)
PHOSPHATE SERPL-MCNC: 3.2 MG/DL — SIGNIFICANT CHANGE UP (ref 2.5–4.5)
PLATELET # BLD AUTO: 312 K/UL — SIGNIFICANT CHANGE UP (ref 150–400)
PLATELET # BLD AUTO: 346 K/UL — SIGNIFICANT CHANGE UP (ref 150–400)
POTASSIUM SERPL-MCNC: 3.9 MMOL/L — SIGNIFICANT CHANGE UP (ref 3.5–5.3)
POTASSIUM SERPL-MCNC: 4.4 MMOL/L — SIGNIFICANT CHANGE UP (ref 3.5–5.3)
POTASSIUM SERPL-SCNC: 3.9 MMOL/L — SIGNIFICANT CHANGE UP (ref 3.5–5.3)
POTASSIUM SERPL-SCNC: 4.4 MMOL/L — SIGNIFICANT CHANGE UP (ref 3.5–5.3)
PROT SERPL-MCNC: 7.7 G/DL — SIGNIFICANT CHANGE UP (ref 6–8.3)
PROT SERPL-MCNC: 8 G/DL — SIGNIFICANT CHANGE UP (ref 6–8.3)
PROT UR-MCNC: NEGATIVE — SIGNIFICANT CHANGE UP
PROTHROM AB SERPL-ACNC: 23.6 SEC — HIGH (ref 10–12.9)
RBC # BLD: 4.21 M/UL — SIGNIFICANT CHANGE UP (ref 3.8–5.2)
RBC # BLD: 4.4 M/UL — SIGNIFICANT CHANGE UP (ref 3.8–5.2)
RBC # FLD: 14.4 % — SIGNIFICANT CHANGE UP (ref 10.3–14.5)
RBC # FLD: 14.5 % — SIGNIFICANT CHANGE UP (ref 10.3–14.5)
SODIUM SERPL-SCNC: 139 MMOL/L — SIGNIFICANT CHANGE UP (ref 135–145)
SODIUM SERPL-SCNC: 139 MMOL/L — SIGNIFICANT CHANGE UP (ref 135–145)
SP GR SPEC: 1.01 — SIGNIFICANT CHANGE UP (ref 1.01–1.02)
TOTAL CHOLESTEROL/HDL RATIO MEASUREMENT: 2.1 RATIO — LOW (ref 3.3–7.1)
TRIGL SERPL-MCNC: 73 MG/DL — SIGNIFICANT CHANGE UP (ref 10–149)
TSH SERPL-MCNC: 2.31 UU/ML — SIGNIFICANT CHANGE UP (ref 0.34–4.82)
UROBILINOGEN FLD QL: NEGATIVE — SIGNIFICANT CHANGE UP
VIT B12 SERPL-MCNC: 754 PG/ML — SIGNIFICANT CHANGE UP (ref 232–1245)
WBC # BLD: 7.44 K/UL — SIGNIFICANT CHANGE UP (ref 3.8–10.5)
WBC # BLD: 7.55 K/UL — SIGNIFICANT CHANGE UP (ref 3.8–10.5)
WBC # FLD AUTO: 7.44 K/UL — SIGNIFICANT CHANGE UP (ref 3.8–10.5)
WBC # FLD AUTO: 7.55 K/UL — SIGNIFICANT CHANGE UP (ref 3.8–10.5)

## 2019-10-29 PROCEDURE — 99285 EMERGENCY DEPT VISIT HI MDM: CPT

## 2019-10-29 PROCEDURE — 99223 1ST HOSP IP/OBS HIGH 75: CPT

## 2019-10-29 PROCEDURE — 74177 CT ABD & PELVIS W/CONTRAST: CPT | Mod: 26

## 2019-10-29 PROCEDURE — 71045 X-RAY EXAM CHEST 1 VIEW: CPT | Mod: 26

## 2019-10-29 RX ORDER — ATORVASTATIN CALCIUM 80 MG/1
40 TABLET, FILM COATED ORAL AT BEDTIME
Refills: 0 | Status: DISCONTINUED | OUTPATIENT
Start: 2019-10-29 | End: 2019-10-31

## 2019-10-29 RX ORDER — MONTELUKAST 4 MG/1
10 TABLET, CHEWABLE ORAL AT BEDTIME
Refills: 0 | Status: DISCONTINUED | OUTPATIENT
Start: 2019-10-29 | End: 2019-10-31

## 2019-10-29 RX ORDER — IPRATROPIUM/ALBUTEROL SULFATE 18-103MCG
3 AEROSOL WITH ADAPTER (GRAM) INHALATION EVERY 6 HOURS
Refills: 0 | Status: DISCONTINUED | OUTPATIENT
Start: 2019-10-29 | End: 2019-10-29

## 2019-10-29 RX ORDER — SOD SULF/SODIUM/NAHCO3/KCL/PEG
4000 SOLUTION, RECONSTITUTED, ORAL ORAL ONCE
Refills: 0 | Status: COMPLETED | OUTPATIENT
Start: 2019-10-29 | End: 2019-10-29

## 2019-10-29 RX ORDER — INFLUENZA VIRUS VACCINE 15; 15; 15; 15 UG/.5ML; UG/.5ML; UG/.5ML; UG/.5ML
0.5 SUSPENSION INTRAMUSCULAR ONCE
Refills: 0 | Status: DISCONTINUED | OUTPATIENT
Start: 2019-10-29 | End: 2019-10-31

## 2019-10-29 RX ORDER — LEVOCETIRIZINE DIHYDROCHLORIDE 0.5 MG/ML
10 SOLUTION ORAL
Qty: 0 | Refills: 0 | DISCHARGE

## 2019-10-29 RX ORDER — OXYMETAZOLINE HYDROCHLORIDE 0.5 MG/ML
1 SPRAY NASAL
Refills: 0 | Status: DISCONTINUED | OUTPATIENT
Start: 2019-10-29 | End: 2019-10-31

## 2019-10-29 RX ORDER — SODIUM CHLORIDE 9 MG/ML
1000 INJECTION, SOLUTION INTRAVENOUS
Refills: 0 | Status: DISCONTINUED | OUTPATIENT
Start: 2019-10-29 | End: 2019-10-31

## 2019-10-29 RX ORDER — IPRATROPIUM/ALBUTEROL SULFATE 18-103MCG
3 AEROSOL WITH ADAPTER (GRAM) INHALATION EVERY 6 HOURS
Refills: 0 | Status: DISCONTINUED | OUTPATIENT
Start: 2019-10-29 | End: 2019-10-31

## 2019-10-29 RX ORDER — PANTOPRAZOLE SODIUM 20 MG/1
40 TABLET, DELAYED RELEASE ORAL
Refills: 0 | Status: DISCONTINUED | OUTPATIENT
Start: 2019-10-29 | End: 2019-10-31

## 2019-10-29 RX ADMIN — ATORVASTATIN CALCIUM 40 MILLIGRAM(S): 80 TABLET, FILM COATED ORAL at 22:32

## 2019-10-29 RX ADMIN — Medication 3 MILLILITER(S): at 20:51

## 2019-10-29 RX ADMIN — PANTOPRAZOLE SODIUM 40 MILLIGRAM(S): 20 TABLET, DELAYED RELEASE ORAL at 18:39

## 2019-10-29 RX ADMIN — Medication 20 MILLIGRAM(S): at 22:29

## 2019-10-29 RX ADMIN — Medication 4000 MILLILITER(S): at 18:39

## 2019-10-29 RX ADMIN — MONTELUKAST 10 MILLIGRAM(S): 4 TABLET, CHEWABLE ORAL at 22:32

## 2019-10-29 NOTE — ED PROVIDER NOTE - PHYSICAL EXAMINATION
Ractal: + non thrombosed small external hemorrhoid at 6oclock position, no active bleeding.   No guarding to full palpation of abdomen.

## 2019-10-29 NOTE — H&P ADULT - PROBLEM SELECTOR PLAN 1
p/w BRBPR, Hb 11.2, INR 2.08  FOBT positive  on coumadin for DVT/PE (unprovoked as per previous document)  Last Colonoscopy 11 yrs ago which showed diverticulosis  hold coumadin  on clears  IV fluids   IV Protonix BID  CBC q6  monitor INR p/w BRBPR, Hb 11.2, INR 2.08  FOBT positive  on coumadin for DVT/PE (unprovoked as per previous document)  Last Colonoscopy 11 yrs ago which showed diverticulosis  CT abdomen/pelvis shows diverticulosis  hold coumadin  NPO  IV fluids   IV Protonix BID  CBC q6  monitor INR p/w BRBPR, Hb 11.2, INR 2.08  FOBT positive  on coumadin for DVT/PE (unprovoked as per previous document)  Last Colonoscopy 11 yrs ago which showed diverticulosis  CT abdomen/pelvis shows diverticulosis  hold coumadin  NPO  IV fluids   IV Protonix BID  CBC q6  monitor INR  GI Dr. Morgan consulted

## 2019-10-29 NOTE — ED ADULT TRIAGE NOTE - CHIEF COMPLAINT QUOTE
" I have lots of blood when I went to bathroom for number two, before that I have pain in stomach. I take Coumadin "

## 2019-10-29 NOTE — ED PROVIDER NOTE - CLINICAL SUMMARY MEDICAL DECISION MAKING FREE TEXT BOX
Pt is on coumadin and ASA, + guaiac. Pt admitted for monitoring and GI consult.   MAR and Dr. Escudero endorsed. Pt agrees with admission. I had a detailed discussion with the patient and/or guardian regarding the historical points, exam findings, and any diagnostic results supporting the admit diagnosis.

## 2019-10-29 NOTE — CHART NOTE - NSCHARTNOTEFT_GEN_A_CORE
Pt is being prepared for colonoscopy tommorrow   All instructions provided to RN    Pt coumadin on hold. pt had PE episode in 2017  colonoscopy 2 years ago showed diverticulosis accord to pt.

## 2019-10-29 NOTE — CHART NOTE - NSCHARTNOTEFT_GEN_A_CORE
Saw patient together with Dr. Robles - GI and medical students and residents   No further bleeding while in the hospital. H/h is stable \  Plan for EGD /Colonoscopy in AM   Start bowel prep today   Vital signs and labs reviewed     Vital Signs Last 24 Hrs  T(C): 36.6 (29 Oct 2019 10:15), Max: 36.6 (29 Oct 2019 06:11)  T(F): 97.8 (29 Oct 2019 10:15), Max: 97.8 (29 Oct 2019 06:11)  HR: 75 (29 Oct 2019 10:15) (75 - 80)  BP: 140/93 (29 Oct 2019 10:15) (140/93 - 170/103)  BP(mean): --  RR: 18 (29 Oct 2019 10:15) (16 - 18)  SpO2: 100% (29 Oct 2019 10:15) (95% - 100%)    1. GI bleed likely lower GI. pt with history of hemorrhoids and diverticulosis   Hold Coumadin for now   Start GoLYTELY today  Liquid diet for now, NPO after midnight     2. Hx of PE on coumadin. Coumadin on hold   3. HTN: resume Enalapril 10 mg po daily   4. Hx of allergies. start Afrin q12   Resume Montelukast    Plan discussed with patient in detail.

## 2019-10-29 NOTE — ED PROVIDER NOTE - OBJECTIVE STATEMENT
Chief complaint of blood per rectum noted at 11pm after using bathroom. No abdominal pain, no chest pain, no shortness of breath, no vomiting.     Meds Coumadin 5mg (s/p PE 2017), Enalapril, Synthroid, Singulair, ASA.

## 2019-10-29 NOTE — CONSULT NOTE ADULT - SUBJECTIVE AND OBJECTIVE BOX
Patient is a 74y old  Female who presents with a chief complaint of BRBPR (29 Oct 2019 06:05)    74F from home, PMHx of HTN, DVT/PE on coumadin, asthma presented to ED c/o BRBPR. She complaints of 2episodes of painless BRBPR at 11pm after she took coumadin. Denies any previous bleeding, abdominal pain, N/V/D, urinary complaint, chest pain, dizziness, cough. She had colonoscopy done 11 yrs ago (?)  which showed diverticulosis. Her last INR 2 weeks ago was 2.2.    REVIEW OF SYSTEMS  Constitutional:   No fever, no fatigue, no pallor, no night sweats, no weight loss.  HEENT:   No eye pain, no vision changes, no icterus, no mouth ulcers.  Respiratory:   No shortness of breath, no cough, no respiratory distress.   Cardiovascular:   No chest pain, no palpitations.   Gastrointestinal: No abdominal pain, no nausea, no vomiting , no diarrhea no constipation, no hematochezia, no melena.  Skin:   No rashes, no jaundice, no eczema.   Musculoskeletal:   No joint pain, no swelling, no myalgia.   Neurologic:   No headache, no seizure, no weakness.   Genitourinary:   No dysuria, no decreased urine output.  Psychiatric:  No depression, no anxiety,   Endocrine:   No thyroid disease, no diabetes.  Heme/Lymphatic:   No anemia, no blood transfusions, no lymph node enlargement, no bleeding, no bruising.  ___________________________________________________________________________________________  Allergies    No Known Allergies    Intolerances      MEDICATIONS  (STANDING):  albuterol/ipratropium for Nebulization 3 milliLiter(s) Nebulizer every 6 hours  atorvastatin 40 milliGRAM(s) Oral at bedtime  bisacodyl 20 milliGRAM(s) Oral at bedtime  dextrose 5% + sodium chloride 0.9%. 1000 milliLiter(s) (75 mL/Hr) IV Continuous <Continuous>  influenza   Vaccine 0.5 milliLiter(s) IntraMuscular once  montelukast 10 milliGRAM(s) Oral at bedtime  pantoprazole  Injectable 40 milliGRAM(s) IV Push two times a day  polyethylene glycol/electrolyte Solution. 4000 milliLiter(s) Oral once    MEDICATIONS  (PRN):  oxymetazoline 0.05% Nasal Spray 1 Spray(s) Both Nostrils two times a day PRN Nasal Congestion      PAST MEDICAL & SURGICAL HISTORY:  DVT (deep venous thrombosis)  Pulmonary embolism  Nasal polyp  Asthma  Arthritis  Seasonal allergies  History of hypertension  No significant past surgical history    FAMILY HISTORY:  Diabetes mellitus (Sibling)    Social History: No hsitory of : Tobacco use, IVDA, EToH  ______________________________________________________________________________________    PHYSICAL EXAM    Daily Height in cm: 167.64 (29 Oct 2019 01:45)    Daily   BMI: 28.4 (10-29 @ 01:45)  Change in Weight:  Vital Signs Last 24 Hrs  T(C): 36.6 (29 Oct 2019 10:15), Max: 36.6 (29 Oct 2019 06:11)  T(F): 97.8 (29 Oct 2019 10:15), Max: 97.8 (29 Oct 2019 06:11)  HR: 75 (29 Oct 2019 10:15) (75 - 80)  BP: 140/93 (29 Oct 2019 10:15) (140/93 - 170/103)  BP(mean): --  RR: 18 (29 Oct 2019 10:15) (16 - 18)  SpO2: 100% (29 Oct 2019 10:15) (95% - 100%)    General:  Well developed, well nourished, alert and active, no pallor, NAD.  HEENT:    Normal appearance of conjunctiva, ears, nose, lips, oropharynx, and oral mucosa, anicteric.  Neck:  No masses, no asymmetry.  Lymph Nodes:  No lymphadenopathy.   Cardiovascular:  RRR normal S1/S2, no murmur.  Respiratory:  CTA B/L, normal respiratory effort.   Abdominal:   soft, no masses or tenderness, normoactive BS, NT/ND, no HSM.  Extremities:   No clubbing or cyanosis, normal capillary refill, no edema.   Skin:   No rash, jaundice, lesions, eczema.   Musculoskeletal:  No joint swelling, erythema or tenderness.   Neuro: No focal deficits.   Other:   _______________________________________________________________________________________________  Lab Results:                          11.7   7.55  )-----------( 346      ( 29 Oct 2019 10:21 )             35.9     10-29    139  |  107  |  16  ----------------------------<  97  3.9   |  27  |  0.78    Ca    9.4      29 Oct 2019 10:21  Phos  3.2     10-29  Mg     2.2     10-29    TPro  8.0  /  Alb  3.4<L>  /  TBili  0.5  /  DBili  x   /  AST  23  /  ALT  30  /  AlkPhos  80  10-29    LIVER FUNCTIONS - ( 29 Oct 2019 10:21 )  Alb: 3.4 g/dL / Pro: 8.0 g/dL / ALK PHOS: 80 U/L / ALT: 30 U/L DA / AST: 23 U/L / GGT: x           PT/INR - ( 29 Oct 2019 03:07 )   PT: 23.6 sec;   INR: 2.08 ratio         PTT - ( 29 Oct 2019 03:07 )  PTT:39.7 sec  Triglycerides, Serum: 73 mg/dL (10-29 @ 10:21)        Stool Results:          RADIOLOGY RESULTS:  < from: CT Abdomen and Pelvis w/ IV Cont (10.29.19 @ 04:38) >    EXAM:  CT ABDOMEN AND PELVIS IC                            PROCEDURE DATE:  10/29/2019          INTERPRETATION:  CLINICAL INFORMATION: Bright red blood per rectum.  The   patient is anticoagulated with Coumadin.    COMPARISON: 03/13/2017.    PROCEDURE:   CT of the Abdomen and Pelvis was performed with intravenous contrast.   Intravenous contrast: 90 ml Omnipaque 350. 10 ml discarded.  Oral contrast: None.  Sagittal and coronal reformats were performed.    FINDINGS:    LOWER CHEST: Mild atherosclerotic calcification of the visualized   coronary arteries.    LIVER: Within normal limits.  BILE DUCTS: Normal caliber.  GALLBLADDER: Within normal limits.  SPLEEN: Within normal limits.  PANCREAS: Within normal limits.  ADRENALS: Within normal limits.  KIDNEYS/URETERS: Approximately 1 cm left renal cyst (2:51)..  An   approximately 8 mm hypodense focus in the right kidney is too small   accurately characterize by CT scan (2:61).    BLADDER: Within normal limits.  REPRODUCTIVE ORGANS: Endometrium measures approximately 4 mm.  No pelvic   mass.    BOWEL: Small hiatal hernia. No bowel obstruction.  Normal appendix.    Right-sided and left-sided colonic diverticulosis without evidence for   acute diverticulitis.  PERITONEUM: No ascites.  VESSELS: Mild atherosclerotic calcification of the aortoiliac tree.  RETROPERITONEUM/LYMPH NODES: No lymphadenopathy.    ABDOMINAL WALL: Within normal limits.  BONES: Mild degenerative changes in the lumbar spine.  No acute bony   abnormality.    IMPRESSION:     Right-sided and left-sided colonic diverticulosis.  No evidence of acute   diverticulitis.                    BAIRON GOMEZ M.D.,ATTENDING RADIOLOGIST  This document has been electronically signed. Oct 29 2019  4:58AM                < end of copied text >    SURGICAL PATHOLOGY:

## 2019-10-29 NOTE — H&P ADULT - PROBLEM SELECTOR PLAN 6
IMPROVE VTE Individual Risk Assessment  RISK                                                                Points  [  ] Previous VTE                                                  3  [  ] Thrombophilia                                               2  [  ] Lower limb paralysis                                      2        (unable to hold up >15 seconds)    [  ] Current Cancer                                              2         (within 6 months)  [x  ] Immobilization > 24 hrs                                1  [  ] ICU/CCU stay > 24 hours                              1  [x  ] Age > 60                                                      1  IMPROVE VTE Score _________2,   hold due to GI bleed

## 2019-10-29 NOTE — H&P ADULT - HISTORY OF PRESENT ILLNESS
74F from home, PMHx of HTN, DVT/PE on coumadin, asthma presented to ED c/o BRBPR. She complaints of 2episodes of painless BRBPR at 11pm after she took coumadin. Denies any previous bleeding, abdominal pain, N/V/D, urinary complaint, chest pain, dizziness, cough. She had colonoscopy done 11 yrs ago which showed diverticulosis. Her last INR 2 weeks ago was 2.2.

## 2019-10-29 NOTE — H&P ADULT - NSHPREVIEWOFSYSTEMS_GEN_ALL_CORE
CONSTITUTIONAL: No fever,   EYES: no acute visual disturbances  NECK: No pain or stiffness  RESPIRATORY: No cough; No shortness of breath  CARDIOVASCULAR: No chest pain, no palpitations  GASTROINTESTINAL: No pain. No nausea or vomiting; No diarrhea   NEUROLOGICAL: No headache or numbness, no tremors  MUSCULOSKELETAL: No joint pain, no muscle pain  GENITOURINARY: no dysuria, no frequency, no hesitancy, +BRBPR  PSYCHIATRY: no depression , no anxiety  ALL OTHER  ROS negative

## 2019-10-29 NOTE — CONSULT NOTE ADULT - ATTENDING COMMENTS
I was physically present for the key portions of the evaluation and management (E/M) service provided.  The patient was personally seen and examined at bedside.    Thank you for your consultation and allowing  me to participate in the care of your patients. If you have further questions please contact me at [phone].     Patricio Barr M.D.       _________________________________________________________________________________________________  [facility]  [address] [place] [address]  Office: [phone]    Aayush Tinoco PA-C  ___________________________________________________________________________________________________

## 2019-10-29 NOTE — H&P ADULT - NSICDXPASTMEDICALHX_GEN_ALL_CORE_FT
PAST MEDICAL HISTORY:  Arthritis     Asthma     DVT (deep venous thrombosis)     History of hypertension     Nasal polyp     Pulmonary embolism     Seasonal allergies

## 2019-10-29 NOTE — H&P ADULT - ATTENDING COMMENTS
Patient seen and examined ; case was discussed with the admitting resident    ROS: as in the HPI; all other ROS negative    SH and family history as above    Vital Signs Last 24 Hrs  T(C): 36.6 (29 Oct 2019 06:11), Max: 36.6 (29 Oct 2019 06:11)  T(F): 97.8 (29 Oct 2019 06:11), Max: 97.8 (29 Oct 2019 06:11)  HR: 75 (29 Oct 2019 06:11) (75 - 80)  BP: 147/96 (29 Oct 2019 06:11) (147/96 - 170/103)  BP(mean): --  RR: 18 (29 Oct 2019 06:11) (16 - 18)  SpO2: 95% (29 Oct 2019 06:11) (95% - 98%)    GEN: NAD, appears younger than stated age   HEENT- normocephalic; mouth moist  CVS- S1S2+  LUNGS- clear to auscultation; no wheezing  ABD: Soft , nontender, nondistended, Bowel sounds are present  EXTREMITY: no calf tenderness, no cyanosis, no edema  NEURO: AAOx3; non focal neurologic exam; cranial nerves grossly intact  PSYCH: normal affect and behavior  BACK: no swelling or mass;   VASCULAR: + distal peripheral pulses  SKIN: warm and dry.       Labs Reviewed:                         11.2   7.44  )-----------( 312      ( 29 Oct 2019 03:07 )             34.9     10-29    139  |  107  |  21<H>  ----------------------------<  104<H>  4.4   |  29  |  0.92    Ca    9.1      29 Oct 2019 03:07    TPro  7.7  /  Alb  3.4<L>  /  TBili  0.4  /  DBili  x   /  AST  23  /  ALT  30  /  AlkPhos  77  10-29  MEDICATIONS  (STANDING):  albuterol/ipratropium for Nebulization 3 milliLiter(s) Nebulizer every 6 hours  atorvastatin 40 milliGRAM(s) Oral at bedtime  dextrose 5% + sodium chloride 0.9%. 1000 milliLiter(s) (75 mL/Hr) IV Continuous <Continuous>  montelukast 10 milliGRAM(s) Oral at bedtime  pantoprazole  Injectable 40 milliGRAM(s) IV Push two times a day        73 y/o F with pmhx massive PE/DVT 3/2017 on coumadin, asa, allergies admitted with BRBPR.  Patient refusing to sign consent for transfusion, only wants to consider if absolutely needed. Discussed that the consent is to have in the event there is an emergency and she declines signing at this time.    1. Acute blood loss anemia- suspect due to diverticular bleed vs hemorrhoidal, hemodynamically stable. Will hold coumadin and asa, discuss reversal with GI and timing of reversal agent if needed. Will keep npo pending their recommendations. Recheck H/H q6 for now, check orthostatic vitals.  2. 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 Would consider heme f/u outpatient to repeat APLS labs. No clinical evidence of dvt/pe currently.   3. HTN- will hold antihypertensives for now.   4. Seasonal allergies     Plan of care discussed with patient ;  all questions and concerns were addressed. Patient seen and examined ; case was discussed with the admitting resident    ROS: as in the HPI; all other ROS negative    SH and family history as above    Vital Signs Last 24 Hrs  T(C): 36.6 (29 Oct 2019 06:11), Max: 36.6 (29 Oct 2019 06:11)  T(F): 97.8 (29 Oct 2019 06:11), Max: 97.8 (29 Oct 2019 06:11)  HR: 75 (29 Oct 2019 06:11) (75 - 80)  BP: 147/96 (29 Oct 2019 06:11) (147/96 - 170/103)  BP(mean): --  RR: 18 (29 Oct 2019 06:11) (16 - 18)  SpO2: 95% (29 Oct 2019 06:11) (95% - 98%)    GEN: NAD, appears younger than stated age   HEENT- normocephalic; mouth moist  CVS- S1S2+  LUNGS- clear to auscultation; no wheezing  ABD: Soft , nontender, nondistended, Bowel sounds are present  EXTREMITY: no calf tenderness, no cyanosis, no edema  NEURO: AAOx3; non focal neurologic exam; cranial nerves grossly intact  PSYCH: normal affect and behavior  BACK: no swelling or mass;   VASCULAR: + distal peripheral pulses  SKIN: warm and dry.       Labs Reviewed:                         11.2   7.44  )-----------( 312      ( 29 Oct 2019 03:07 )             34.9     10-29    139  |  107  |  21<H>  ----------------------------<  104<H>  4.4   |  29  |  0.92    Ca    9.1      29 Oct 2019 03:07    TPro  7.7  /  Alb  3.4<L>  /  TBili  0.4  /  DBili  x   /  AST  23  /  ALT  30  /  AlkPhos  77  10-29  MEDICATIONS  (STANDING):  albuterol/ipratropium for Nebulization 3 milliLiter(s) Nebulizer every 6 hours  atorvastatin 40 milliGRAM(s) Oral at bedtime  dextrose 5% + sodium chloride 0.9%. 1000 milliLiter(s) (75 mL/Hr) IV Continuous <Continuous>  montelukast 10 milliGRAM(s) Oral at bedtime  pantoprazole  Injectable 40 milliGRAM(s) IV Push two times a day        73 y/o F with pmhx massive PE/DVT 3/2017 on coumadin, asa, allergies admitted with BRBPR.  Patient refusing to sign consent for transfusion, only wants to consider if absolutely needed. Discussed that the consent is to have in the event there is an emergency and she declines signing at this time.    1. Acute blood loss anemia- suspect due to diverticular bleed vs hemorrhoidal, hemodynamically stable. Will hold coumadin and asa, discuss reversal with GI and timing of reversal agent if needed. Will keep npo pending their recommendations. Recheck H/H q6 for now, check orthostatic vitals.  2. 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 Would consider heme f/u outpatient to repeat APLS labs. No clinical evidence of dvt/pe currently.   3. HTN- will hold antihypertensives for now.   4. Seasonal allergies     Plan of care discussed with patient ;  all questions and concerns were addressed.

## 2019-10-30 LAB
ALBUMIN SERPL ELPH-MCNC: 3.3 G/DL — LOW (ref 3.5–5)
ALP SERPL-CCNC: 77 U/L — SIGNIFICANT CHANGE UP (ref 40–120)
ALT FLD-CCNC: 27 U/L DA — SIGNIFICANT CHANGE UP (ref 10–60)
ANION GAP SERPL CALC-SCNC: 6 MMOL/L — SIGNIFICANT CHANGE UP (ref 5–17)
AST SERPL-CCNC: 23 U/L — SIGNIFICANT CHANGE UP (ref 10–40)
BASOPHILS # BLD AUTO: 0.05 K/UL — SIGNIFICANT CHANGE UP (ref 0–0.2)
BASOPHILS NFR BLD AUTO: 0.8 % — SIGNIFICANT CHANGE UP (ref 0–2)
BILIRUB SERPL-MCNC: 0.7 MG/DL — SIGNIFICANT CHANGE UP (ref 0.2–1.2)
BUN SERPL-MCNC: 11 MG/DL — SIGNIFICANT CHANGE UP (ref 7–18)
CALCIUM SERPL-MCNC: 8.9 MG/DL — SIGNIFICANT CHANGE UP (ref 8.4–10.5)
CHLORIDE SERPL-SCNC: 108 MMOL/L — SIGNIFICANT CHANGE UP (ref 96–108)
CO2 SERPL-SCNC: 26 MMOL/L — SIGNIFICANT CHANGE UP (ref 22–31)
CREAT SERPL-MCNC: 0.89 MG/DL — SIGNIFICANT CHANGE UP (ref 0.5–1.3)
EOSINOPHIL # BLD AUTO: 0.29 K/UL — SIGNIFICANT CHANGE UP (ref 0–0.5)
EOSINOPHIL NFR BLD AUTO: 4.5 % — SIGNIFICANT CHANGE UP (ref 0–6)
GLUCOSE SERPL-MCNC: 108 MG/DL — HIGH (ref 70–99)
HCT VFR BLD CALC: 33.9 % — LOW (ref 34.5–45)
HGB BLD-MCNC: 11.1 G/DL — LOW (ref 11.5–15.5)
IMM GRANULOCYTES NFR BLD AUTO: 0.2 % — SIGNIFICANT CHANGE UP (ref 0–1.5)
LYMPHOCYTES # BLD AUTO: 2.15 K/UL — SIGNIFICANT CHANGE UP (ref 1–3.3)
LYMPHOCYTES # BLD AUTO: 33.1 % — SIGNIFICANT CHANGE UP (ref 13–44)
MAGNESIUM SERPL-MCNC: 2.1 MG/DL — SIGNIFICANT CHANGE UP (ref 1.6–2.6)
MCHC RBC-ENTMCNC: 26.7 PG — LOW (ref 27–34)
MCHC RBC-ENTMCNC: 32.7 GM/DL — SIGNIFICANT CHANGE UP (ref 32–36)
MCV RBC AUTO: 81.7 FL — SIGNIFICANT CHANGE UP (ref 80–100)
MONOCYTES # BLD AUTO: 0.49 K/UL — SIGNIFICANT CHANGE UP (ref 0–0.9)
MONOCYTES NFR BLD AUTO: 7.5 % — SIGNIFICANT CHANGE UP (ref 2–14)
NEUTROPHILS # BLD AUTO: 3.51 K/UL — SIGNIFICANT CHANGE UP (ref 1.8–7.4)
NEUTROPHILS NFR BLD AUTO: 53.9 % — SIGNIFICANT CHANGE UP (ref 43–77)
NRBC # BLD: 0 /100 WBCS — SIGNIFICANT CHANGE UP (ref 0–0)
PHOSPHATE SERPL-MCNC: 3.6 MG/DL — SIGNIFICANT CHANGE UP (ref 2.5–4.5)
PLATELET # BLD AUTO: 330 K/UL — SIGNIFICANT CHANGE UP (ref 150–400)
POTASSIUM SERPL-MCNC: 3.5 MMOL/L — SIGNIFICANT CHANGE UP (ref 3.5–5.3)
POTASSIUM SERPL-SCNC: 3.5 MMOL/L — SIGNIFICANT CHANGE UP (ref 3.5–5.3)
PROT SERPL-MCNC: 7.6 G/DL — SIGNIFICANT CHANGE UP (ref 6–8.3)
RBC # BLD: 4.15 M/UL — SIGNIFICANT CHANGE UP (ref 3.8–5.2)
RBC # FLD: 14.6 % — HIGH (ref 10.3–14.5)
SODIUM SERPL-SCNC: 140 MMOL/L — SIGNIFICANT CHANGE UP (ref 135–145)
WBC # BLD: 6.5 K/UL — SIGNIFICANT CHANGE UP (ref 3.8–10.5)
WBC # FLD AUTO: 6.5 K/UL — SIGNIFICANT CHANGE UP (ref 3.8–10.5)

## 2019-10-30 PROCEDURE — 99233 SBSQ HOSP IP/OBS HIGH 50: CPT | Mod: GC

## 2019-10-30 RX ADMIN — MONTELUKAST 10 MILLIGRAM(S): 4 TABLET, CHEWABLE ORAL at 22:04

## 2019-10-30 RX ADMIN — PANTOPRAZOLE SODIUM 40 MILLIGRAM(S): 20 TABLET, DELAYED RELEASE ORAL at 17:13

## 2019-10-30 RX ADMIN — PANTOPRAZOLE SODIUM 40 MILLIGRAM(S): 20 TABLET, DELAYED RELEASE ORAL at 05:51

## 2019-10-30 RX ADMIN — Medication 3 MILLILITER(S): at 20:51

## 2019-10-30 RX ADMIN — ATORVASTATIN CALCIUM 40 MILLIGRAM(S): 80 TABLET, FILM COATED ORAL at 22:04

## 2019-10-30 RX ADMIN — Medication 3 MILLILITER(S): at 15:15

## 2019-10-30 NOTE — CHART NOTE - NSCHARTNOTEFT_GEN_A_CORE
Colonoscopy Report  Indication: Rectal bleeding    Referring: Dr. Larios   Instrument:[id number]  Anesthesia: MAC  Consent:  Informed consent was obtained from the patient after providing any opportunity for questions  Procedure: After placing the patient in the left lateral decubitus position, the colonoscope was gently inserted into the cecum.  The Ti was intubated and examined.  Color, texture, mucosa, and anatomy of the colon were carefully examined with the scope. The patient tolerated the procedure well. After completion of the exam, the patient was transferred to the recovery room.     Preparation: GL. Prep was excellent     Findings:   Anal Canal	External hemorrhoids   Rectum	Normal  Sigmoid Colon 	At the recto-sigmoid junction fresh blood. A 8mm lateral spreading polyp with active oozing of blood appreciated. (The polyp was not removed in the setting of elevated INR). The area around the polyp was injected with 4 ml of EPI and three hemo-clips placed over the polyp achieving hemo-stasis.   Descending Colon	Many scattered large diverticula.   Splenic Flexure	Normal  Transverse Colon	Many scattered large diverticula.  Hepatic Flexure	Normal  Ascending Colon	Many scattered large diverticula.  Cecum	Many scattered large diverticula.  Ileo-cecal Valve	Normal  Ileum 	Normal  Date and time: 10/30/2019 9:48 AM  EBL:0    Impression:  1- Diverticulosis (pan) 2- Bleeding recto-sigmoid polyp (this polyp with need a lift and EMR).   Plan: 1- Resume diet as tolerated 2- Monitor CBC 3- Repeat colonoscopy with polypectomy (when INR is confirmed to be sub-therapeutic) can be done outpatient             Procedure Start Time:  8:59 am   Cecum Reached Time: 9:07 am  Procedure End Time:   9:25 am  Total Withdrawal Time: 18 minutes                             Attending:       Patricio Morgan M.D.   Date and Time: 10/30/2019 9:48:26 AM

## 2019-10-31 VITALS
RESPIRATION RATE: 18 BRPM | HEART RATE: 66 BPM | SYSTOLIC BLOOD PRESSURE: 122 MMHG | TEMPERATURE: 98 F | DIASTOLIC BLOOD PRESSURE: 73 MMHG | OXYGEN SATURATION: 100 %

## 2019-10-31 LAB
ALBUMIN SERPL ELPH-MCNC: 3.1 G/DL — LOW (ref 3.5–5)
ALP SERPL-CCNC: 67 U/L — SIGNIFICANT CHANGE UP (ref 40–120)
ALT FLD-CCNC: 25 U/L DA — SIGNIFICANT CHANGE UP (ref 10–60)
ANION GAP SERPL CALC-SCNC: 5 MMOL/L — SIGNIFICANT CHANGE UP (ref 5–17)
AST SERPL-CCNC: 22 U/L — SIGNIFICANT CHANGE UP (ref 10–40)
BASOPHILS # BLD AUTO: 0.07 K/UL — SIGNIFICANT CHANGE UP (ref 0–0.2)
BASOPHILS NFR BLD AUTO: 1.2 % — SIGNIFICANT CHANGE UP (ref 0–2)
BILIRUB SERPL-MCNC: 0.7 MG/DL — SIGNIFICANT CHANGE UP (ref 0.2–1.2)
BUN SERPL-MCNC: 13 MG/DL — SIGNIFICANT CHANGE UP (ref 7–18)
CALCIUM SERPL-MCNC: 8.8 MG/DL — SIGNIFICANT CHANGE UP (ref 8.4–10.5)
CHLORIDE SERPL-SCNC: 110 MMOL/L — HIGH (ref 96–108)
CO2 SERPL-SCNC: 27 MMOL/L — SIGNIFICANT CHANGE UP (ref 22–31)
CREAT SERPL-MCNC: 0.86 MG/DL — SIGNIFICANT CHANGE UP (ref 0.5–1.3)
EOSINOPHIL # BLD AUTO: 0.29 K/UL — SIGNIFICANT CHANGE UP (ref 0–0.5)
EOSINOPHIL NFR BLD AUTO: 4.9 % — SIGNIFICANT CHANGE UP (ref 0–6)
GLUCOSE SERPL-MCNC: 94 MG/DL — SIGNIFICANT CHANGE UP (ref 70–99)
HCT VFR BLD CALC: 30.9 % — LOW (ref 34.5–45)
HGB BLD-MCNC: 10 G/DL — LOW (ref 11.5–15.5)
IMM GRANULOCYTES NFR BLD AUTO: 0.2 % — SIGNIFICANT CHANGE UP (ref 0–1.5)
LYMPHOCYTES # BLD AUTO: 1.89 K/UL — SIGNIFICANT CHANGE UP (ref 1–3.3)
LYMPHOCYTES # BLD AUTO: 31.8 % — SIGNIFICANT CHANGE UP (ref 13–44)
MAGNESIUM SERPL-MCNC: 2.1 MG/DL — SIGNIFICANT CHANGE UP (ref 1.6–2.6)
MCHC RBC-ENTMCNC: 26.5 PG — LOW (ref 27–34)
MCHC RBC-ENTMCNC: 32.4 GM/DL — SIGNIFICANT CHANGE UP (ref 32–36)
MCV RBC AUTO: 82 FL — SIGNIFICANT CHANGE UP (ref 80–100)
MONOCYTES # BLD AUTO: 0.36 K/UL — SIGNIFICANT CHANGE UP (ref 0–0.9)
MONOCYTES NFR BLD AUTO: 6.1 % — SIGNIFICANT CHANGE UP (ref 2–14)
NEUTROPHILS # BLD AUTO: 3.33 K/UL — SIGNIFICANT CHANGE UP (ref 1.8–7.4)
NEUTROPHILS NFR BLD AUTO: 55.8 % — SIGNIFICANT CHANGE UP (ref 43–77)
NRBC # BLD: 0 /100 WBCS — SIGNIFICANT CHANGE UP (ref 0–0)
PHOSPHATE SERPL-MCNC: 3.4 MG/DL — SIGNIFICANT CHANGE UP (ref 2.5–4.5)
PLATELET # BLD AUTO: 282 K/UL — SIGNIFICANT CHANGE UP (ref 150–400)
POTASSIUM SERPL-MCNC: 3.9 MMOL/L — SIGNIFICANT CHANGE UP (ref 3.5–5.3)
POTASSIUM SERPL-SCNC: 3.9 MMOL/L — SIGNIFICANT CHANGE UP (ref 3.5–5.3)
PROT SERPL-MCNC: 6.9 G/DL — SIGNIFICANT CHANGE UP (ref 6–8.3)
RBC # BLD: 3.77 M/UL — LOW (ref 3.8–5.2)
RBC # FLD: 14.7 % — HIGH (ref 10.3–14.5)
SODIUM SERPL-SCNC: 142 MMOL/L — SIGNIFICANT CHANGE UP (ref 135–145)
WBC # BLD: 5.95 K/UL — SIGNIFICANT CHANGE UP (ref 3.8–10.5)
WBC # FLD AUTO: 5.95 K/UL — SIGNIFICANT CHANGE UP (ref 3.8–10.5)

## 2019-10-31 PROCEDURE — 99239 HOSP IP/OBS DSCHRG MGMT >30: CPT | Mod: GC

## 2019-10-31 RX ORDER — MONTELUKAST 4 MG/1
1 TABLET, CHEWABLE ORAL
Qty: 0 | Refills: 0 | DISCHARGE
Start: 2019-10-31

## 2019-10-31 RX ORDER — WARFARIN SODIUM 2.5 MG/1
1 TABLET ORAL
Qty: 0 | Refills: 0 | DISCHARGE

## 2019-10-31 RX ORDER — ATORVASTATIN CALCIUM 80 MG/1
1 TABLET, FILM COATED ORAL
Qty: 0 | Refills: 0 | DISCHARGE
Start: 2019-10-31

## 2019-10-31 RX ORDER — PANTOPRAZOLE SODIUM 20 MG/1
40 TABLET, DELAYED RELEASE ORAL
Qty: 0 | Refills: 0 | DISCHARGE
Start: 2019-10-31

## 2019-10-31 RX ADMIN — PANTOPRAZOLE SODIUM 40 MILLIGRAM(S): 20 TABLET, DELAYED RELEASE ORAL at 06:33

## 2019-10-31 RX ADMIN — Medication 3 MILLILITER(S): at 03:04

## 2019-10-31 NOTE — PROGRESS NOTE ADULT - PROBLEM SELECTOR PLAN 1
-pt prepped for colonoscopy and had 9 BM WITH blood till morning  -colonoscopy done = 2 polps founds 1 bleeding polyp clipped  -pt have to goback for removal of polyp after coumadin discontinuation  -started regular diet
Resolved with endoscopic therapy   PATIENT NEED GI FOLLOW UP TO REMOVE POLYP ONCE INR IS AROUND 1.5

## 2019-10-31 NOTE — PROGRESS NOTE ADULT - ATTENDING COMMENTS
Advanced care planning was discussed with patient and family.  Advanced care planning forms were reviewed and discussed.  Risks, benefits and alternatives of gastroenterologic procedures were discussed in detail and all questions were answered.      I was physically present for the key portions of the evaluation and management (E/M) service provided.  The patient was personally seen and examined at bedside.    Thank you for your consultation and allowing  me to participate in the care of your patients. If you have further questions please contact me at [phone].     Patricio Barr M.D.       _________________________________________________________________________________________________  [facility]  [address] [place] [address]  Office: [phone]    Aayush Tinoco PA-C  ___________________________________________________________________________________________________
Patient seen and examined together with residents and medical student.   Had colonoscopy today, found to have a bleeding polyp and many diverticula. Clips were placed on bleeding polyp.   Pt herself had no complains.     Vital signs and labs reviewed   Vital Signs Last 24 Hrs  T(C): 36.2 (30 Oct 2019 14:14), Max: 36.6 (29 Oct 2019 21:14)  T(F): 97.2 (30 Oct 2019 14:14), Max: 97.9 (29 Oct 2019 21:14)  HR: 70 (30 Oct 2019 14:14) (66 - 70)  BP: 126/64 (30 Oct 2019 14:14) (123/67 - 158/77)  BP(mean): --  RR: 18 (30 Oct 2019 14:14) (17 - 18)  SpO2: 99% (30 Oct 2019 14:14) (98% - 100%)    1. GI bleed secondary to bleeding polyp. s/p clipping. Polyps were not removed secondary to elevated INR.   Advance diet to regular   Switch pantoprazole for 40 mg po daily   Repeat CBC in AM   Pt to follow up with her gastroenterologist as outpatient for polyp removal.     2. Hx of PE. coumadin on hold   She will follow up with her hematologist    Discharge in AM

## 2019-10-31 NOTE — PROGRESS NOTE ADULT - ASSESSMENT
74 year old female with a active bleeding polyp.
74F from home, PMHx of HTN, DVT/PE on coumadin, asthma presented to ED c/o BRBPR.

## 2019-10-31 NOTE — DISCHARGE NOTE PROVIDER - NSDCMRMEDTOKEN_GEN_ALL_CORE_FT
atorvastatin 40 mg oral tablet: 1 tab(s) orally once a day (at bedtime)  enalapril 10 mg oral tablet: 1 tab(s) orally once a day  montelukast 10 mg oral tablet: 1 tab(s) orally once a day (at bedtime)  pantoprazole 40 mg intravenous injection: 40 milligram(s) intravenous 2 times a day

## 2019-10-31 NOTE — DISCHARGE NOTE PROVIDER - HOSPITAL COURSE
HPI:    74F from home, PMHx of HTN, DVT/PE on coumadin, asthma presented to ED c/o BRBPR. She complaints of 2episodes of painless BRBPR at 11pm after she took coumadin. Denies any previous bleeding, abdominal pain, N/V/D, urinary complaint, chest pain, dizziness, cough. She had colonoscopy done 11 yrs ago which showed diverticulosis. Her last INR 2 weeks ago was 2.2    Pt came in with BRBPR and colonoscopy was done next day and found to have 2 polyps, 1 bleeding profusely which was clipped but pt was not off anticoagulations so polyps were not removed. Pt has to go for their removal.

## 2019-10-31 NOTE — PROGRESS NOTE ADULT - SUBJECTIVE AND OBJECTIVE BOX
Summary:   74y  Female      Subjective:   No further events of bleeding    Objective:    MEDICATIONS  (STANDING):  albuterol/ipratropium for Nebulization 3 milliLiter(s) Nebulizer every 6 hours  atorvastatin 40 milliGRAM(s) Oral at bedtime  dextrose 5% + sodium chloride 0.9%. 1000 milliLiter(s) (75 mL/Hr) IV Continuous <Continuous>  influenza   Vaccine 0.5 milliLiter(s) IntraMuscular once  montelukast 10 milliGRAM(s) Oral at bedtime  pantoprazole  Injectable 40 milliGRAM(s) IV Push two times a day    MEDICATIONS  (PRN):  oxymetazoline 0.05% Nasal Spray 1 Spray(s) Both Nostrils two times a day PRN Nasal Congestion              Vital Signs Last 24 Hrs  T(C): 37.3 (31 Oct 2019 05:20), Max: 37.3 (31 Oct 2019 05:20)  T(F): 99.1 (31 Oct 2019 05:20), Max: 99.1 (31 Oct 2019 05:20)  HR: 69 (31 Oct 2019 05:20) (65 - 80)  BP: 115/56 (31 Oct 2019 05:20) (115/56 - 129/63)  BP(mean): --  RR: 17 (31 Oct 2019 05:20) (17 - 18)  SpO2: 100% (31 Oct 2019 05:20) (96% - 100%)      General:  Well developed, well nourished, alert and active, no pallor, NAD.  HEENT:    Normal appearance of conjunctiva, ears, nose, lips, oropharynx, and oral mucosa, anicteric.  Neck:  No masses, no asymmetry.  Lymph Nodes:  No lymphadenopathy.   Cardiovascular:  RRR normal S1/S2, no murmur.  Respiratory:  CTA B/L, normal respiratory effort.   Abdominal:   soft, no masses or tenderness, normoactive BS, NT/ND, no HSM.  Extremities:   No clubbing or cyanosis, normal capillary refill, no edema.   Skin:   No rash, jaundice, lesions, eczema.   Musculoskeletal:  No joint swelling, erythema or tenderness.   Neuro: No focal deficits.   Other:       LABS:                        10.0   5.95  )-----------( 282      ( 31 Oct 2019 07:50 )             30.9     10-31    142  |  110<H>  |  13  ----------------------------<  94  3.9   |  27  |  0.86    Ca    8.8      31 Oct 2019 07:50  Phos  3.4     10-31  Mg     2.1     10-31    TPro  6.9  /  Alb  3.1<L>  /  TBili  0.7  /  DBili  x   /  AST  22  /  ALT  25  /  AlkPhos  67  10-31          RADIOLOGY & ADDITIONAL TESTS:
PGY1 Note discussed with Supervising Resident and Primary Attending.    Patient is a 74y old  Female who presents with a chief complaint of BRBPR (29 Oct 2019 13:01)      INTERVAL HPI/OVERNIGHT EVENTS :    ***********************************************************************************************************    MEDICATIONS  (STANDING):  albuterol/ipratropium for Nebulization 3 milliLiter(s) Nebulizer every 6 hours  atorvastatin 40 milliGRAM(s) Oral at bedtime  dextrose 5% + sodium chloride 0.9%. 1000 milliLiter(s) (75 mL/Hr) IV Continuous <Continuous>  influenza   Vaccine 0.5 milliLiter(s) IntraMuscular once  montelukast 10 milliGRAM(s) Oral at bedtime  pantoprazole  Injectable 40 milliGRAM(s) IV Push two times a day    MEDICATIONS  (PRN):  oxymetazoline 0.05% Nasal Spray 1 Spray(s) Both Nostrils two times a day PRN Nasal Congestion      ***********************************************************************************************************    Allergies    No Known Allergies    Intolerances        ***********************************************************************************************************    REVIEW OF SYSTEMS :  * CONSTITUTIONAL      : No Fever, Weight loss, or Fatigue  * EYES                             : No eye pain , Visual disturbances or Discharge  * RESPIRATORY             : No Cough, Wheezing, Chills or Hemoptysis; No shortness of breath  * CARDIOVASCULAR     : No Chest pain, Palpitations, Dizziness, or Leg swelling  * GASTROINTESTINAL  : No Abdominal or Epigastric pain. No Nausea, Vomiting or Hematemesis; No Diarrhea or Constipation. No Melena or Hematochezia.  * GENITOURINARY        : No Dysuria , Frequency , Haematuria   * NEUROLOGICAL          : No Headaches, Memory loss, Loss of trength, Numbness, or Tremors  * MUSCULOSKELETAL   : No Joint pain  * PSYCHIATRY                 : No Depression or Anxiety   * HEME/LYMPH              : No Easy Bruising or Bleeding gums  * SKIN                               : No Itching, Burning, Rashes, or Lesions     ***********************************************************************************************************    Vital Signs Last 24 Hrs  T(C): 36.3 (30 Oct 2019 04:50), Max: 36.6 (29 Oct 2019 21:14)  T(F): 97.4 (30 Oct 2019 04:50), Max: 97.9 (29 Oct 2019 21:14)  HR: 68 (30 Oct 2019 04:50) (66 - 71)  BP: 123/67 (30 Oct 2019 04:50) (123/67 - 158/77)  BP(mean): --  RR: 18 (30 Oct 2019 04:50) (16 - 18)  SpO2: 98% (30 Oct 2019 04:50) (98% - 100%)    ***********************************************************************************************************    PHYSICAL EXAM :  * GENERAL                 : NAD, Well-groomed, Well-developed  * HEAD                       :  Atraumatic, Normocephalic  * EYES                         : EOMI, PERRLA, Conjunctiva and Sclera clear  * ENT                           : Moist Mucous Membranes  * NECK                         : Supple, No JVD, Normal Thyroid  * CHEST/LUNG           : Clear to Auscultation bilaterally; No Rales, Rhonchi, Wheezing or Rubs  * HEART                       : Regular Rate and Rhythm; No murmurs, Rubs or gallops  * ABDOMEN                : Soft, Non-tender, Non-distended; Bowel Sounds present  * NERVOUS SYSTEM  :  Alert & Oriented X3, Good Concentration; Motor Strength 5/5 B/L UL LL ; DTRs 2+ Intact and Symmetric  * EXTREMITIES            :  2+ Peripheral Pulses, No clubbing, cyanosis, or edema  * SKIN                           : No Rashes or Lesions    **********************************************************************************************************  LABS:                          11.1   6.50  )-----------( 330      ( 30 Oct 2019 07:04 )             33.9     10-    140  |  108  |  11  ----------------------------<  108<H>  3.5   |  26  |  0.89    Ca    8.9      30 Oct 2019 07:04  Phos  3.6     10-30  Mg     2.1     10-    TPro  7.6  /  Alb  3.3<L>  /  TBili  0.7  /  DBili  x   /  AST  23  /  ALT  27  /  AlkPhos  77  10-30    PT/INR - ( 29 Oct 2019 03:07 )   PT: 23.6 sec;   INR: 2.08 ratio         PTT - ( 29 Oct 2019 03:07 )  PTT:39.7 sec  Urinalysis Basic - ( 29 Oct 2019 04:15 )    Color: Yellow / Appearance: Clear / S.015 / pH: x  Gluc: x / Ketone: Negative  / Bili: Negative / Urobili: Negative   Blood: x / Protein: Negative / Nitrite: Negative   Leuk Esterase: Negative / RBC: 5-10 /HPF / WBC 0-2 /HPF   Sq Epi: x / Non Sq Epi: Few /HPF / Bacteria: Moderate /HPF      CAPILLARY BLOOD GLUCOSE          **********************************************************************************************************    RADIOLOGY & ADDITIONAL TESTS:   No radiological imaging was required    Imaging Personally Reviewed   :  [x ] YES  [ ] NO    Consultant(s) Notes Reviewed :  [x ] YES  [ ] NO  x

## 2019-10-31 NOTE — DISCHARGE NOTE PROVIDER - NSDCCPCAREPLAN_GEN_ALL_CORE_FT
PRINCIPAL DISCHARGE DIAGNOSIS  Diagnosis: GI bleed  Assessment and Plan of Treatment: You came in after GI bleed with bright red stool per rectum. Gastroentrologist was consulted and your colonoscopy was performed and was found to have two polyps one of which was bleeding and was clipped but not removed due to recent anticoagulation use. Please followup as outpatient with our gastroentrologist as out pt and get those polyps removed.      SECONDARY DISCHARGE DIAGNOSES  Diagnosis: Asthma  Assessment and Plan of Treatment: You have a histroy of asthma and your symptoms were controlled by duoneb in hospital. please followup with your PCP within 1 week of discharge.

## 2019-10-31 NOTE — DISCHARGE NOTE NURSING/CASE MANAGEMENT/SOCIAL WORK - PATIENT PORTAL LINK FT
You can access the FollowMyHealth Patient Portal offered by [facility] by registering at the following website: [email or web address]. By joining [employer]’s FollowMyHealth portal, you will also be able to view your health information using other applications (apps) compatible with our system.

## 2019-10-31 NOTE — DISCHARGE NOTE PROVIDER - CARE PROVIDER_API CALL
Leonard Thapa)  Gastroenterology; Internal Medicine  [facility], [address] [place] [id number]  Phone: [phone]  Fax: [phone]  Follow Up Time:

## 2019-11-01 ENCOUNTER — OUTPATIENT (OUTPATIENT)
Dept: OUTPATIENT SERVICES | Facility: HOSPITAL | Age: 74
LOS: 1 days | End: 2019-11-01
Payer: MEDICARE

## 2019-11-01 PROCEDURE — G9001: CPT

## 2019-11-11 DIAGNOSIS — Z71.89 OTHER SPECIFIED COUNSELING: ICD-10-CM

## 2019-11-12 PROCEDURE — 83690 ASSAY OF LIPASE: CPT

## 2019-11-12 PROCEDURE — 86850 RBC ANTIBODY SCREEN: CPT

## 2019-11-12 PROCEDURE — 80061 LIPID PANEL: CPT

## 2019-11-12 PROCEDURE — 86901 BLOOD TYPING SEROLOGIC RH(D): CPT

## 2019-11-12 PROCEDURE — 85610 PROTHROMBIN TIME: CPT

## 2019-11-12 PROCEDURE — 86900 BLOOD TYPING SEROLOGIC ABO: CPT

## 2019-11-12 PROCEDURE — 83605 ASSAY OF LACTIC ACID: CPT

## 2019-11-12 PROCEDURE — C1776: CPT

## 2019-11-12 PROCEDURE — 71045 X-RAY EXAM CHEST 1 VIEW: CPT

## 2019-11-12 PROCEDURE — 99285 EMERGENCY DEPT VISIT HI MDM: CPT | Mod: 25

## 2019-11-12 PROCEDURE — 93005 ELECTROCARDIOGRAM TRACING: CPT

## 2019-11-12 PROCEDURE — 85027 COMPLETE CBC AUTOMATED: CPT

## 2019-11-12 PROCEDURE — 94640 AIRWAY INHALATION TREATMENT: CPT

## 2019-11-12 PROCEDURE — 82272 OCCULT BLD FECES 1-3 TESTS: CPT

## 2019-11-12 PROCEDURE — 84100 ASSAY OF PHOSPHORUS: CPT

## 2019-11-12 PROCEDURE — 74177 CT ABD & PELVIS W/CONTRAST: CPT

## 2019-11-12 PROCEDURE — 82607 VITAMIN B-12: CPT

## 2019-11-12 PROCEDURE — C1889: CPT

## 2019-11-12 PROCEDURE — 83735 ASSAY OF MAGNESIUM: CPT

## 2019-11-12 PROCEDURE — 85730 THROMBOPLASTIN TIME PARTIAL: CPT

## 2019-11-12 PROCEDURE — 83036 HEMOGLOBIN GLYCOSYLATED A1C: CPT

## 2019-11-12 PROCEDURE — 81001 URINALYSIS AUTO W/SCOPE: CPT

## 2019-11-12 PROCEDURE — 84443 ASSAY THYROID STIM HORMONE: CPT

## 2019-11-12 PROCEDURE — 36415 COLL VENOUS BLD VENIPUNCTURE: CPT

## 2019-11-12 PROCEDURE — 80053 COMPREHEN METABOLIC PANEL: CPT

## 2019-11-14 ENCOUNTER — EMERGENCY (EMERGENCY)
Facility: HOSPITAL | Age: 74
LOS: 1 days | Discharge: HOME | End: 2019-11-14
Attending: EMERGENCY MEDICINE
Payer: MEDICARE

## 2019-11-14 VITALS
HEIGHT: 66 IN | TEMPERATURE: 97 F | RESPIRATION RATE: 18 BRPM | DIASTOLIC BLOOD PRESSURE: 96 MMHG | OXYGEN SATURATION: 96 % | SYSTOLIC BLOOD PRESSURE: 182 MMHG | HEART RATE: 80 BPM | WEIGHT: 175.93 LBS

## 2019-11-14 PROCEDURE — 99283 EMERGENCY DEPT VISIT LOW MDM: CPT

## 2019-11-14 PROCEDURE — 99282 EMERGENCY DEPT VISIT SF MDM: CPT

## 2019-11-14 RX ORDER — SODIUM CHLORIDE 9 MG/ML
1000 INJECTION, SOLUTION INTRAVENOUS ONCE
Refills: 0 | Status: DISCONTINUED | OUTPATIENT
Start: 2019-11-14 | End: 2019-11-14

## 2019-11-14 NOTE — ED PROVIDER NOTE - CLINICAL SUMMARY MEDICAL DECISION MAKING FREE TEXT BOX
73 yo F w hematemesis during colonoscopy prep.  Currently well appearing, normal vital signs.  GIven pt has colonoscopy scheduled for today and that would be the definitive treatment for GI bleed, will d/c pt to get her colonoscopy.

## 2019-11-14 NOTE — ED PROVIDER NOTE - PATIENT PORTAL LINK FT
You can access the FollowMyHealth Patient Portal offered by [facility] by registering at the following website: [email or web address]. By joining [employer]’s FollowMyHealth portal, you will also be able to view your health information using other applications (apps) compatible with our system.

## 2019-11-14 NOTE — ED PROVIDER NOTE - OBJECTIVE STATEMENT
73 yo F here w 3 episodes of hematemesis while drinking a prep for colonoscopy today.  Pt now feeling well.  Has not vomited since last night.  Vitals stable.  Would like to leave to make it to her colonoscopy.

## 2019-11-14 NOTE — ED PROVIDER NOTE - NS ED ROS FT
Eyes:  No visual changes, eye pain or discharge.  ENMT:  No hearing changes, pain, no sore throat or runny nose, no difficulty swallowing  Cardiac:  No chest pain, SOB or edema. No chest pain with exertion.  Respiratory:  No cough or respiratory distress. No hemoptysis. No history of asthma or RAD.  GI:  hematemesis   :  No dysuria, frequency or burning.  MS:  No myalgia, muscle weakness, joint pain or back pain.  Neuro:  No headache or weakness.  No LOC.  Skin:  No skin rash.   Endocrine: No history of thyroid disease or diabetes.

## 2019-11-14 NOTE — ED ADULT TRIAGE NOTE - CHIEF COMPLAINT QUOTE
C/o vomiting blood x 1 episode this morning. Pt is scheduled for colonoscopy at 0915 at [facility] today

## 2019-11-14 NOTE — ED ADULT NURSE NOTE - CHIEF COMPLAINT QUOTE
C/o vomiting blood x 1 episode this morning. Pt is scheduled for colonoscopy at 0915 at [facility] today

## 2020-03-10 NOTE — ED ADULT NURSE NOTE - ATTEMPT TO OOB
[address] ambulance transport arranged for 10:30am to [facility] for [facility], room 206W, report [phone]. This plan is ok with Mr. Muriel Stout in room 209 and with his son Mr. Wilber Gonzalez (son, in person), and CHRISTIE Valdivia. no

## 2021-01-02 NOTE — PROGRESS NOTE ADULT - SUBJECTIVE AND OBJECTIVE BOX
HPI:  72 years old female from home lives with sisters, with PMH of Asthma (with nasal polyp, on Singulair), HTN (on Lisinopril for years), OA, and multiple spontaneous abortions (no kids, unable to conceive), and Massive b/l PE with LLE DVT(required ICU admission in 3/2017 at [facility], on warfarin),  presented to the ED c/o right sided aching pain, radiating to right shoulder, back and lung started last night. Last night she had high blood pressure-she does not remember how high it was, but it was around 170/125-, so she took extra dose of enalapril 10mg and somewhat achieved relief and fell asleep. This morning when she was watching TV, same pain came back with intensity of 5/10 so decided to seek medical help. Pain is on and off, and is different from the pain from PE that she experienced last time. Denies having SOB, palpitation, orthopnea, fever, chills, or any other symptoms.    In ED, patient was given 1 dose of 162mg aspirin, vital signs are stable, labs stable, currently patient is asymptomatic. CT angio was performed, showing resolving previous PE but no new PE. EKG NSR at 83 BPM, cardiac enzymes x 1 negative. Admitted to the telemetry floor for evaluation of chest pain. (17 Oct 2017 22:05)      Patient is a 72y old  Female who presents with a chief complaint of Right sided chest pain onset last night and this morning. (17 Oct 2017 22:05)      INTERVAL HPI/OVERNIGHT EVENTS:  T(C): 36.6 (10-18-17 @ 11:09), Max: 37 (10-18-17 @ 01:34)  HR: 55 (10-18-17 @ 11:09) (54 - 77)  BP: 102/60 (10-18-17 @ 11:09) (102/60 - 158/81)  RR: 17 (10-18-17 @ 11:09) (16 - 18)  SpO2: 98% (10-18-17 @ 11:09) (98% - 100%)  Wt(kg): --  I&O's Summary      REVIEW OF SYSTEMS: denies fever, chills, SOB, palpitations, chest pain, abdominal pain, nausea, vomitting, diarrhea, constipation, dizziness    MEDICATIONS  (STANDING):  aspirin  chewable 81 milliGRAM(s) Oral daily  atorvastatin 40 milliGRAM(s) Oral at bedtime  enalapril 20 milliGRAM(s) Oral daily  influenza   Vaccine 0.5 milliLiter(s) IntraMuscular once  loratadine 10 milliGRAM(s) Oral daily  metoprolol 25 milliGRAM(s) Oral two times a day  montelukast 10 milliGRAM(s) Oral at bedtime  warfarin 6 milliGRAM(s) Oral once    MEDICATIONS  (PRN):      PHYSICAL EXAM:  GENERAL: NAD, well-groomed, well-developed  HEAD:  Atraumatic, Normocephalic  EYES: EOMI, PERRLA, conjunctiva and sclera clear  ENMT: No tonsillar erythema, exudates, or enlargement; Moist mucous membranes, Good dentition, No lesions  NECK: Supple, No JVD, Normal thyroid  NERVOUS SYSTEM:  Alert & Oriented X3, Good concentration; Motor Strength 5/5 B/L upper and lower extremities; DTRs 2+ intact and symmetric  CHEST/LUNG: Clear to percussion bilaterally; No rales, rhonchi, wheezing, or rubs  HEART: Regular rate and rhythm; No murmurs, rubs, or gallops  ABDOMEN: Soft, Nontender, Nondistended; Bowel sounds present  EXTREMITIES:  2+ Peripheral Pulses, No clubbing, cyanosis, or edema  LYMPH: No lymphadenopathy noted  SKIN: No rashes or lesions  LABS:                        12.2   6.0   )-----------( 319      ( 18 Oct 2017 07:17 )             38.6     10-18    140  |  107  |  13  ----------------------------<  86  3.7   |  27  |  0.76    Ca    9.3      18 Oct 2017 07:17  Phos  3.6     10-18  Mg     2.2     10-18    TPro  8.3  /  Alb  3.6  /  TBili  0.4  /  DBili  x   /  AST  25  /  ALT  41  /  AlkPhos  85  10-17    PT/INR - ( 18 Oct 2017 07:17 )   PT: 28.0 sec;   INR: 2.52 ratio         PTT - ( 17 Oct 2017 16:44 )  PTT:50.8 sec    CAPILLARY BLOOD GLUCOSE
PGY 1 Note discussed with supervising resident and primary attending    Patient is a 72y old  Female who presents with a chief complaint of Right sided chest pain onset last night and this morning. (17 Oct 2017 22:05)      INTERVAL HPI/OVERNIGHT EVENTS: patient seen and examined at bed side, chest pain improved  offers no new complaints; current symptoms resolving    MEDICATIONS  (STANDING):  aspirin  chewable 81 milliGRAM(s) Oral daily  atorvastatin 40 milliGRAM(s) Oral at bedtime  enalapril 20 milliGRAM(s) Oral daily  fluticasone propionate 50 MICROgram(s)/spray Nasal Spray 1 Spray(s) Both Nostrils two times a day  influenza   Vaccine 0.5 milliLiter(s) IntraMuscular once  loratadine 10 milliGRAM(s) Oral daily  metoprolol 25 milliGRAM(s) Oral two times a day  montelukast 10 milliGRAM(s) Oral at bedtime  warfarin 6 milliGRAM(s) Oral once    MEDICATIONS  (PRN):      __________________________________________________  REVIEW OF SYSTEMS:    CONSTITUTIONAL: No fever,   EYES: no acute visual disturbances  NECK: No pain or stiffness  RESPIRATORY: No cough; No shortness of breath  CARDIOVASCULAR: No chest pain, no palpitations  GASTROINTESTINAL: No pain. No nausea or vomiting; No diarrhea   NEUROLOGICAL: No headache or numbness, no tremors  MUSCULOSKELETAL: No joint pain, no muscle pain  GENITOURINARY: no dysuria, no frequency, no hesitancy  PSYCHIATRY: no depression , no anxiety  ALL OTHER  ROS negative        Vital Signs Last 24 Hrs  T(C): 36.6 (18 Oct 2017 11:09), Max: 37 (18 Oct 2017 01:34)  T(F): 97.8 (18 Oct 2017 11:09), Max: 98.6 (18 Oct 2017 01:34)  HR: 55 (18 Oct 2017 11:09) (54 - 77)  BP: 102/60 (18 Oct 2017 11:09) (102/60 - 158/81)  BP(mean): --  RR: 17 (18 Oct 2017 11:09) (16 - 18)  SpO2: 98% (18 Oct 2017 11:09) (98% - 100%)    ________________________________________________  PHYSICAL EXAM:  GENERAL: NAD  HEENT: Normocephalic;  conjunctivae and sclerae clear; moist mucous membranes;   NECK : supple  CHEST/LUNG: Clear to auscultation bilaterally with good air entry   HEART: S1 S2  regular; no murmurs, gallops or rubs  ABDOMEN: Soft, Nontender, Nondistended; Bowel sounds present  EXTREMITIES: no cyanosis; no edema; no calf tenderness  SKIN: warm and dry; no rash  NERVOUS SYSTEM:  Awake and alert; Oriented  to place, person and time ; no new deficits    _________________________________________________  LABS:                        12.2   6.0   )-----------( 319      ( 18 Oct 2017 07:17 )             38.6     10-18    140  |  107  |  13  ----------------------------<  86  3.7   |  27  |  0.76    Ca    9.3      18 Oct 2017 07:17  Phos  3.6     10-18  Mg     2.2     10-18    TPro  8.3  /  Alb  3.6  /  TBili  0.4  /  DBili  x   /  AST  25  /  ALT  41  /  AlkPhos  85  10-17    PT/INR - ( 18 Oct 2017 07:17 )   PT: 28.0 sec;   INR: 2.52 ratio         PTT - ( 17 Oct 2017 16:44 )  PTT:50.8 sec    CAPILLARY BLOOD GLUCOSE            RADIOLOGY & ADDITIONAL TESTS:    Imaging Personally Reviewed:  YES  CT  head Comparison 3/13/2017.  There is interval resolution of prior acute bilateral pulmonary emboli.   No acute pulmonary emboli at this time. Weblike defect in in the right   pulmonary artery represents chronic sequelae of prior pulmonary emboli.   No evidence of acute pulmonary emboli at this time. Nonaneurysmal   thoracic aorta. Central airways are patent. No mediastinal adenopathy.  Bilateral mosaic parenchymal attenuation pattern similar to prior may   reflect air trapping secondary to small airways disease, and/or small   vessel disease. No lobar consolidation or pleural effusion. Small hiatal   hernia.  Heart not grossly enlarged. No pericardial abnormality.  With the exception of some colonic diverticula visualized upper abdomen   not remarkable.  No acute skeletal abnormality.    Impression:    No evidence of acute pulmonary emboli.      Consultant(s) Notes Reviewed:   YES    Care Discussed with Consultants : Yes     Plan of care was discussed with patient and /or primary care giver; all questions and concerns were addressed and care was aligned with patient's wishes.
No

## 2021-02-08 NOTE — PATIENT PROFILE ADULT - PSYCHOSOCIAL CONCERNS
none Terbinafine Pregnancy And Lactation Text: This medication is Pregnancy Category B and is considered safe during pregnancy. It is also excreted in breast milk and breast feeding isn't recommended.

## 2021-02-18 NOTE — PATIENT PROFILE ADULT. - NS PRO CONTRA FLU 1
DATE OF SURGERY:  08/31/2017    SURGEON:  Osbaldo Oreilly MD    REFERRING PHYSICIAN: Delfina Gibbons DO    PREOPERATIVE DIAGNOSIS(ES):  Ventral hernia x2.    POSTOPERATIVE DIAGNOSIS(ES):  Ventral hernia x2.    PROCEDURE:  Laparoscopic repair of ventral hernia x2 with mesh with 15 x 19 [place]-Jaziel mesh.    INDICATIONS FOR PROCEDURE:  The patient is a 44-year-old man who developed a lump at his umbilicus and another lump just lateral to it.  He was found to have 2 ventral hernia defects on examination.  He did have enlarged liver and history of alcoholism, so he was cleared by his primary care physician to undergo the surgery.  Risks and benefits of a laparoscopic ventral hernia repair with mesh including bleeding, infection, scar formation, recurrence, chronic numbness, chronic pain were all discussed with the patient, who understood and agreed and consents were signed and placed on the chart.    PROCEDURE IN DETAIL:  The patient was taken to the operating room, placed on the operating table in a supine position.  He was prepped and draped in usual sterile manner following general endotracheal anesthesia.  He was given preoperative IV antibiotics and SCD boots and a Doe catheter.  A Veress needle was then used to gain access into the peritoneal cavity at the subcostal margin of the left upper quadrant in the midclavicular line.  The abdomen was inflated to 15 pounds of CO2 without difficulty.  Next, an incision was made in the left upper quadrant.  An 11 mm port was then placed laparoscopically through the abdomen under laparoscopic vision.  The Veress needle was identified and there was no viscus injury or bleeding noted.  The Veress needle was removed.  Next, the left lower quadrant 5 mm port was placed in the abdomen under laparoscopic vision.  A 3rd port was placed in the left mid quadrant laterally between the prior 2 incisions under laparoscopic vision as well.     Operative findings included  congested, but viable omentum through 2 ventral hernia defects, one located at the umbilicus and one located just left lateral to the umbilicus.  The patient also had rectus diastasis.  He did have an enlarged liver and congested vessels in his omentum.  Because of the congested vessels, the omentum was carefully taken down using electrocautery and hemoclips to prevent any bleeding.  After the omentum was reduced, there were a few adhesions of the omentum to the hernia sacs and these were lysed again using electrocautery and hemoclips.  There was no significant bleeding noted.  A 15 x 19 cm DUALMESH Plus Sagle-Jaziel mesh was chosen that felt would adequately cover both of the defects with adequate overlap.  Six 0-Sagle-Jaziel sutures were placed in the periphery of the mesh circumferentially.  The mesh was then rolled and then delivered into the abdomen through the 11 mm port site.  It was unrolled in the abdomen and fashioned so that the smooth side was facing posteriorly and the rough mesh side was facing anteriorly against the abdominal wall.  Using a Devaughn-Rock Passer device, 6 stab incisions were made circumferentially in the anterior abdominal wall carefully measuring as each incision was made so that there would be excellent tension of the mesh.  The sutures were passed through the abdominal wall and then tied.  There was excellent tension of the mesh.  A ProTacker device was then used to create an outer ring of tacks along the hernia defect and multiple tacks were placed in the inside as an inner [place].  A tack was also placed at the stalk of the umbilicus tacking the stalk of the umbilicus down towards the mesh.  The tack did not stoner the skin whatsoever.  Next, the surgical sites were inspected and there was no bleeding noted.  The 11 mm port was removed and a Devaughn-Rock suture device was used to approximate the fascia with an 0-Vicryl suture.  Next, the abdomen was desufflated and all other ports  were removed.  The skin was closed in all incisions using subcuticular 4-0 Vicryl sutures.  Steri-Strips and Mastisol were applied.  The patient tolerated the procedure well without immediate complications.        ______________________________  Osbaldo Oreilly MD  [id number]      D:  08/31/2017 09:38:49  T:  08/31/2017 11:20:55   Indicee/modl   Job:  [id number]          yes

## 2021-03-01 NOTE — ED PROVIDER NOTE - AXIS
Service Date: 03/01/2021      Oliver Baires returns for followup.  He is a delightful 80-year-old gentleman.  We met him back in 2004 when he presented with an inferior myocardial infarction.  He is a tall gentleman.  No classic features of Marfan otherwise.  Historically, as mentioned, he had a heart attack in 2004.  We stented the right coronary artery and did balloon angioplasty of the PDA.  In 2010, he had another acute coronary syndrome.  The right coronary artery had only mild narrowing.  The LAD had a new lesion and it was stented.  The circumflex had 25% narrowing.  Followup stress test about a year or 2 ago showed inferior infarct with very minimal bang-infarction ischemia.  He was in [place] and had a DVT twice and it turned out he has an aortic aneurysm.  We had reviewed, and again reviewed today, the [place] data.  The CAT scans are inaccurate in that they did not describe what part of the aorta they were measuring so we have done it here and also with echo and that was reviewed today.  His echo shows normal ejection fraction, mild LVH.  His sinus of Valsalva is 47 mm and the CAT scan said 48, and the ascending aorta was 41 mm and the CAT scan a year or 2 ago said 41 also.  I showed him his data going back a few years.  The aorta for the last 2-3 years has been completely stable, but it was enlarged compared to 2004 when we first met him.  He reports no cardiovascular complaints except some fatigue.  He states it is a little hard to exercise.  He runs out of air a little sooner.  He had gone into atrial fibrillation and never realized it.  Coincidentally, he was already on a beta blocker and was somewhat bradycardic and he was already on Xarelto because of the DVT, so we elected not to do anything with atrial fib.  We actually took him off the beta blocker at one point because of slow heart rate, but because of the aortic dilatation, I put him on Toprol 12.5.  I checked his heart rate today, and walking in  the [place], his heart rate was 104, which would probably be appropriate.  We did have him wear a Holter monitor last year and his heart rates both daytime and nighttime when he is sleeping and his maximum rates were all reasonable.  I did again talk to him about the fact that we will watch out for any need for a pacemaker.  We are going to keep him on the beta blocker I explained because of the properties that it may decrease the force of contraction and lessen the chance of aortic root enlargement and also because he has coronary disease.  He is past due for a lipid profile, so I gave him a note to see Dr. Henry for that.  In 2019, his LDL was above goal unfortunately.  My plan then is we will see him back next year.  We will repeat the echocardiogram next year to make sure the aortic valve insufficiency has not progressed, that the aorta is not enlarged.  We will have him wear a Holter monitor for 24 hours again to see what his heart rate is throughout a day.  I do not think he needs a stress test until probably 2022 unless there is a change in any cardiac symptoms.  We will be watching for pacemaker need.  He will continue on the low-dose beta blocker and Xarelto for now.  Also of note, his echo did show slight right heart enlargement and a dilated IVC, so there is some suspicion he has some pulmonary hypertension which might explain his right ventricular enlargement and his right atrial enlargement more than the left atrium.  He has adamantly declined going to the sleep doctor, which was recommended a year ago, and he also tells us he does not really want to wear a CPAP machine even if he was diagnosed with sleep apnea.  I did encourage him to reconsider those thoughts.      Today's visit was 32 minutes.      cc:   MICKY Henry MD   [facility]   [address] [place]  [address]         AARON STRANGE MD             D: 03/01/2021   T: 03/01/2021   MT: lupe      Name:     DIANA ZHAO   MRN:       [id number]        Account:      [id number]   :      1941           Service Date: 2021      Document: [id number]     Normal

## 2021-08-12 NOTE — ED PROVIDER NOTE - CONSTITUTIONAL MENTATION
Number Of Curettages: 2 Bill As A Line Item Or As Units: Line Item Bill For Surgical Tray: no Additional Information: (Optional): The wound was cleaned, and a pressure dressing was applied.  The patient received detailed post-op instructions. Consent was obtained from the patient. The risks, benefits and alternatives to therapy were discussed in detail. Specifically, the risks of infection, scarring, bleeding, prolonged wound healing, nerve injury, incomplete removal, allergy to anesthesia and recurrence were addressed. Alternatives to ED&C, such as: surgical removal and XRT were also discussed.  Prior to the procedure, the treatment site was clearly identified and confirmed by the patient. All components of Universal Protocol/PAUSE Rule completed. Biopsy Type: H and E Post-Care Instructions: I reviewed with the patient in detail post-care instructions. Patient is to keep the area dry for 48 hours, and not to engage in any swimming until the area is healed. Should the patient develop any fevers, chills, bleeding, severe pain patient will contact the office immediately. Size Of Lesion After Curettage: 1 Detail Level: Simple Anesthesia Type: 1% lidocaine with epinephrine Histology Text: Following the procedure a portion of the curetted material was sent for histologic evaluation. Size Of Lesion After Curettage: 1.5 Billing Type: Third-Party Bill Cautery Type: electrodesiccation What Was Performed First?: Curettage awake/able to answer questions/alert/oriented to person, place, time/situation

## 2023-05-15 NOTE — ED PROVIDER NOTE - DISPOSITION TYPE
LVM for patient to call back and confirm upcoming appointment  Left call back number for patient on VM 
DISCHARGE

## 2023-10-27 NOTE — ED PROVIDER NOTE - ENMT, MLM
PATIENT PRESENT TO ER WITH COMPLAINT OF COLD 2 DAYS AGO AND THEN TODAY SOME CHEST PAIN    Airway patent, Nasal mucosa clear. Mouth with normal mucosa. Throat has no vesicles, no oropharyngeal exudates and uvula is midline.

## 2024-12-13 NOTE — ED PROVIDER NOTE - CADM POA PRESS ULCER
Sebas Julio MD  [facility] [id number] [facility] [id number] minutes ago (12:43 PM)       E prescribed med to their pharmacy.  Call patient and inform.     Patient aware.   No

## 2025-05-01 NOTE — DISCHARGE NOTE ADULT - LAUNCH MEDICATION RECONCILIATION
Called patient to discuss normal results. Patient had no questions at the moment and understood everything. All details were provide and this encounter can be close.      <<-----Click here for Discharge Medication Review

## 2025-06-20 NOTE — ED PROVIDER NOTE - NS ED MD EM SELECTION
[Normal RUE] : Right Upper Extremity: No scars, rashes, lesions, ulcers, skin intact [Normal LUE] : Left Upper Extremity: No scars, rashes, lesions, ulcers, skin intact [Normal Touch] : sensation intact for touch [Normal] : Alert and in no acute distress [de-identified] : Right Upper Extremity o Shoulder :  Inspection/Palpation : tender over the greater tuberosity, tender over the anterior and posterior glenohumeral joint lines, no swelling, no deformities  Range of Motion : ACTIVE FORWARD ELEVATION: Measured at 125 degrees, ACTIVE EXTERNAL ROTATION: Measured at 40 degrees, ACTIVE INTERNAL ROTATION: Measured at T12  Strength : external rotation 5/5, internal rotation 5/5, supraspinatus 5/5  Stability : no joint instability on provocative testing  Tests/Signs : Neer (+), [place] (+) o Upper Arm : no tenderness, no swelling, no deformities o Muscle Bulk : no atrophy o Sensation : sensation intact to light touch o Skin : no skin rash or discoloration o Vascular Exam : no edema, no cyanosis, radial and ulnar pulses normal   Left Upper Extremity o Shoulder :  Inspection/Palpation : tender over the greater tuberosity, tender over the anterior and posterior glenohumeral joint lines, no swelling, no deformities  Range of Motion : ACTIVE FORWARD ELEVATION: Measured at 130 degrees, ACTIVE EXTERNAL ROTATION: Measured at 55 degrees, ACTIVE INTERNAL ROTATION: Measured at T9  Strength : external rotation 5/5, internal rotation 5/5, supraspinatus 5/5  Stability : no joint instability on provocative testing  Tests/Signs : Neer (+), [place] (+) o Upper Arm : no tenderness, no swelling, no deformities o Muscle Bulk : no atrophy o Sensation : sensation intact to light touch o Skin : no skin rash or discoloration o Vascular Exam : no edema, no cyanosis, radial and ulnar pulses normal [de-identified] : _________________________ XR obtained on 06/20/2025 :  o Right Shoulder : Grashey, Axillary and Outlet views were obtained, there are no soft tissue abnormalities, no fractures, alignment is normal, severe glenohumeral osteoarthritis with inferior osteophyte formation, normal bone density, no bony lesions. _________________________ XR obtained on 06/20/2025 :  o Left Shoulder : Grashey, Axillary and Outlet views were obtained, there are no soft tissue abnormalities, no fractures, alignment is normal, severe glenohumeral osteoarthritis with inferior osteophyte formation, normal bone density, no bony lesions.    XRays Obtained on 01/31/2024:  o RIGHT Shoulder : Grashey, Axillary and Outlet views were obtained, there are no soft tissue abnormalities, no fractures, alignment is normal, severe glenohumeral osteoarthritis with bone on bone apposition and large inferior osteophyte formation off the humeral head, normal bone density, no bony lesions.  o LEFT Shoulder : Grashey, Axillary and Outlet views were obtained, there are no soft tissue abnormalities, no fractures, alignment is normal, moderate glenohumeral osteoarthritis, normal bone density, no bony lesions.      [address] Critical Care x add'l 30 min

## 2025-06-22 ENCOUNTER — EMERGENCY (EMERGENCY)
Facility: HOSPITAL | Age: 80
LOS: 1 days | End: 2025-06-22
Attending: EMERGENCY MEDICINE
Payer: MEDICARE

## 2025-06-22 VITALS
SYSTOLIC BLOOD PRESSURE: 129 MMHG | TEMPERATURE: 98 F | RESPIRATION RATE: 18 BRPM | HEART RATE: 81 BPM | DIASTOLIC BLOOD PRESSURE: 77 MMHG | OXYGEN SATURATION: 99 %

## 2025-06-22 VITALS
TEMPERATURE: 97 F | WEIGHT: 149.91 LBS | RESPIRATION RATE: 19 BRPM | DIASTOLIC BLOOD PRESSURE: 78 MMHG | HEART RATE: 76 BPM | OXYGEN SATURATION: 97 % | SYSTOLIC BLOOD PRESSURE: 131 MMHG

## 2025-06-22 LAB
ACETONE SERPL-MCNC: NEGATIVE — SIGNIFICANT CHANGE UP
ALBUMIN SERPL ELPH-MCNC: 3.5 G/DL — SIGNIFICANT CHANGE UP (ref 3.5–5)
ALP SERPL-CCNC: 80 U/L — SIGNIFICANT CHANGE UP (ref 40–120)
ALT FLD-CCNC: 46 U/L DA — SIGNIFICANT CHANGE UP (ref 10–60)
ANION GAP SERPL CALC-SCNC: 10 MMOL/L — SIGNIFICANT CHANGE UP (ref 5–17)
APPEARANCE UR: CLEAR — SIGNIFICANT CHANGE UP
AST SERPL-CCNC: 30 U/L — SIGNIFICANT CHANGE UP (ref 10–40)
BACTERIA # UR AUTO: ABNORMAL /HPF
BASOPHILS # BLD AUTO: 0.05 K/UL — SIGNIFICANT CHANGE UP (ref 0–0.2)
BASOPHILS NFR BLD AUTO: 0.4 % — SIGNIFICANT CHANGE UP (ref 0–2)
BILIRUB SERPL-MCNC: 0.7 MG/DL — SIGNIFICANT CHANGE UP (ref 0.2–1.2)
BILIRUB UR-MCNC: NEGATIVE — SIGNIFICANT CHANGE UP
BUN SERPL-MCNC: 21 MG/DL — HIGH (ref 7–18)
CALCIUM SERPL-MCNC: 9.4 MG/DL — SIGNIFICANT CHANGE UP (ref 8.4–10.5)
CHLORIDE SERPL-SCNC: 108 MMOL/L — SIGNIFICANT CHANGE UP (ref 96–108)
CO2 SERPL-SCNC: 21 MMOL/L — LOW (ref 22–31)
COLOR SPEC: YELLOW — SIGNIFICANT CHANGE UP
CREAT SERPL-MCNC: 0.97 MG/DL — SIGNIFICANT CHANGE UP (ref 0.5–1.3)
DIFF PNL FLD: NEGATIVE — SIGNIFICANT CHANGE UP
EGFR: 59 ML/MIN/1.73M2 — LOW
EGFR: 59 ML/MIN/1.73M2 — LOW
EOSINOPHIL # BLD AUTO: 0.06 K/UL — SIGNIFICANT CHANGE UP (ref 0–0.5)
EOSINOPHIL NFR BLD AUTO: 0.5 % — SIGNIFICANT CHANGE UP (ref 0–6)
EPI CELLS # UR: PRESENT
GLUCOSE SERPL-MCNC: 106 MG/DL — HIGH (ref 70–99)
GLUCOSE UR QL: NEGATIVE MG/DL — SIGNIFICANT CHANGE UP
HCT VFR BLD CALC: 35.3 % — SIGNIFICANT CHANGE UP (ref 34.5–45)
HGB BLD-MCNC: 12 G/DL — SIGNIFICANT CHANGE UP (ref 11.5–15.5)
IMM GRANULOCYTES NFR BLD AUTO: 0.3 % — SIGNIFICANT CHANGE UP (ref 0–0.9)
KETONES UR QL: 40 MG/DL
LEUKOCYTE ESTERASE UR-ACNC: ABNORMAL
LIDOCAIN IGE QN: 37 U/L — SIGNIFICANT CHANGE UP (ref 13–75)
LYMPHOCYTES # BLD AUTO: 1.09 K/UL — SIGNIFICANT CHANGE UP (ref 1–3.3)
LYMPHOCYTES # BLD AUTO: 9.3 % — LOW (ref 13–44)
MAGNESIUM SERPL-MCNC: 2 MG/DL — SIGNIFICANT CHANGE UP (ref 1.6–2.6)
MCHC RBC-ENTMCNC: 27.8 PG — SIGNIFICANT CHANGE UP (ref 27–34)
MCHC RBC-ENTMCNC: 34 G/DL — SIGNIFICANT CHANGE UP (ref 32–36)
MCV RBC AUTO: 81.9 FL — SIGNIFICANT CHANGE UP (ref 80–100)
MONOCYTES # BLD AUTO: 0.56 K/UL — SIGNIFICANT CHANGE UP (ref 0–0.9)
MONOCYTES NFR BLD AUTO: 4.8 % — SIGNIFICANT CHANGE UP (ref 2–14)
NEUTROPHILS # BLD AUTO: 9.9 K/UL — HIGH (ref 1.8–7.4)
NEUTROPHILS NFR BLD AUTO: 84.7 % — HIGH (ref 43–77)
NITRITE UR-MCNC: NEGATIVE — SIGNIFICANT CHANGE UP
NRBC BLD AUTO-RTO: 0 /100 WBCS — SIGNIFICANT CHANGE UP (ref 0–0)
NT-PROBNP SERPL-SCNC: 145 PG/ML — SIGNIFICANT CHANGE UP (ref 0–450)
PH UR: 5 — SIGNIFICANT CHANGE UP (ref 5–8)
PLATELET # BLD AUTO: 245 K/UL — SIGNIFICANT CHANGE UP (ref 150–400)
POTASSIUM SERPL-MCNC: 4 MMOL/L — SIGNIFICANT CHANGE UP (ref 3.5–5.3)
POTASSIUM SERPL-SCNC: 4 MMOL/L — SIGNIFICANT CHANGE UP (ref 3.5–5.3)
PROT SERPL-MCNC: 7.8 G/DL — SIGNIFICANT CHANGE UP (ref 6–8.3)
PROT UR-MCNC: NEGATIVE MG/DL — SIGNIFICANT CHANGE UP
RBC # BLD: 4.31 M/UL — SIGNIFICANT CHANGE UP (ref 3.8–5.2)
RBC # FLD: 14.6 % — HIGH (ref 10.3–14.5)
RBC CASTS # UR COMP ASSIST: 1 /HPF — SIGNIFICANT CHANGE UP (ref 0–4)
SODIUM SERPL-SCNC: 139 MMOL/L — SIGNIFICANT CHANGE UP (ref 135–145)
SP GR SPEC: 1.02 — SIGNIFICANT CHANGE UP (ref 1–1.03)
UROBILINOGEN FLD QL: 1 MG/DL — SIGNIFICANT CHANGE UP (ref 0.2–1)
WBC # BLD: 11.69 K/UL — HIGH (ref 3.8–10.5)
WBC # FLD AUTO: 11.69 K/UL — HIGH (ref 3.8–10.5)
WBC UR QL: 3 /HPF — SIGNIFICANT CHANGE UP (ref 0–5)

## 2025-06-22 PROCEDURE — 81001 URINALYSIS AUTO W/SCOPE: CPT

## 2025-06-22 PROCEDURE — 85025 COMPLETE CBC W/AUTO DIFF WBC: CPT

## 2025-06-22 PROCEDURE — 96374 THER/PROPH/DIAG INJ IV PUSH: CPT

## 2025-06-22 PROCEDURE — 87086 URINE CULTURE/COLONY COUNT: CPT

## 2025-06-22 PROCEDURE — 83880 ASSAY OF NATRIURETIC PEPTIDE: CPT

## 2025-06-22 PROCEDURE — 99284 EMERGENCY DEPT VISIT MOD MDM: CPT | Mod: 25

## 2025-06-22 PROCEDURE — 36415 COLL VENOUS BLD VENIPUNCTURE: CPT

## 2025-06-22 PROCEDURE — 87077 CULTURE AEROBIC IDENTIFY: CPT

## 2025-06-22 PROCEDURE — 83690 ASSAY OF LIPASE: CPT

## 2025-06-22 PROCEDURE — 82009 KETONE BODYS QUAL: CPT

## 2025-06-22 PROCEDURE — 80053 COMPREHEN METABOLIC PANEL: CPT

## 2025-06-22 PROCEDURE — 83735 ASSAY OF MAGNESIUM: CPT

## 2025-06-22 PROCEDURE — 99284 EMERGENCY DEPT VISIT MOD MDM: CPT

## 2025-06-22 RX ORDER — ONDANSETRON HCL/PF 4 MG/2 ML
4 VIAL (ML) INJECTION ONCE
Refills: 0 | Status: ACTIVE | OUTPATIENT
Start: 2025-06-22

## 2025-06-22 RX ORDER — ONDANSETRON HCL/PF 4 MG/2 ML
1 VIAL (ML) INJECTION
Qty: 15 | Refills: 0
Start: 2025-06-22 | End: 2025-06-26

## 2025-06-22 RX ADMIN — Medication 125 MILLILITER(S): at 17:56

## 2025-06-22 RX ADMIN — Medication 20 MILLIGRAM(S): at 17:54

## 2025-06-22 NOTE — ED PROVIDER NOTE - OBJECTIVE STATEMENT
80-year-old female with history of HLD, HTN, DVT left leg/PE on Eliquis.  Patient complaining of vomiting x 1 a lot, watery stool x 3 today.  She dented both on and off mid abdominal pain.  Pain is relieved with diarrhea.  Patient endorsed with decreased appetite, denies fever, chills, dysuria.  Patient had quesadilla for dinner last night

## 2025-06-22 NOTE — ED ADULT NURSE NOTE - OBJECTIVE STATEMENT
Patient c/o of abdominal pain 5/10 with nausea and vomiting that started today. Sister at bedside translating per patient request. Denies diarrhea, chest pain. SOB, blood in the urine or stool. No acute distress noted.

## 2025-06-22 NOTE — ED PROVIDER NOTE - PATIENT PORTAL LINK FT
You can access the FollowMyHealth Patient Portal offered by [facility] by registering at the following website: [email or web address]. By joining [facility]’s FollowMyHealth portal, you will also be able to view your health information using other applications (apps) compatible with our system.

## 2025-06-22 NOTE — ED PROVIDER NOTE - NSFOLLOWUPINSTRUCTIONS_ED_ALL_ED_FT
Opciones de alimentos para ayudar a aliviar la diarrea en los adultos  Food Choices to Help Relieve Diarrhea, Adult  La diarrea puede hacerlo sentir débil y deshidratarlo. La deshidratación es chantal afección que se caracteriza por chantal cantidad insuficiente de agua u otros líquidos en el organismo. Es importante elegir los alimentos y las bebidas que marilu adecuados para lo siguiente:  Aliviar la diarrea.  Remplazar los líquidos y nutrientes perdidos.  Evitar la deshidratación.  Consejos para seguir john plan  Alivio de la diarrea    Evite los alimentos que empeoren la diarrea. Pueden incluir:  Alimentos y bebidas endulzados con jarabe de maíz de alto contenido de fructosa, miel o endulzantes, tammy xilitol, sorbitol y manitol. Consulte las etiquetas de los alimentos para jeff si tienen estos ingredientes.  Comidas fritas, grasosas o condimentadas.  Frutas y verduras crudas.  Consuma alimentos con alto contenido de probióticos. Entre estos alimentos, se incluyen el yogur y los productos fermentados de la leche. Los probióticos pueden contribuir a aumentar la cantidad de bacterias saludables del estómago y de los intestinos (tubo digestivo o tracto gastrointestinal [GI]). [place] puede favorecer la digestión y detener la diarrea.  Si tiene intolerancia a la lactosa, evite los productos lácteos. Estos podrían empeorar la diarrea.  Breda los medicamentos para detener la diarrea solo tammy se lo haya indicado el médico.  Reemplazo de nutrientes    Bananas next to a bowl of applesauce.  Consuma alimentos blandos y fáciles de digerir en pequeñas cantidades, en la medida en que pueda, hasta que la diarrea comience a mejorar. Estos alimentos incluyen bananas, compota de manzana, arroz, tostadas y galletas saladas.  Con el tiempo, agregue alimentos ricos en nutrientes a medida que el cuerpo los tolere o tammy se lo indique el médico. [place] incluye lo siguiente:  Alimentos proteicos miguel cocidos, tammy huevos, aldair magras, tammy pescado o brittany sin piel, y tofu.  Frutas y verduras peladas, sin semillas y apenas cocidas.  Productos lácteos con bajo contenido de grasa.  Cereales integrales.  Breda suplementos de vitaminas y minerales tammy se lo haya indicado el médico.  Evitar la deshidratación    A bottle of clear fruit juice and glass of water.  Comience bebiendo pequeños sorbos de agua o de chantal solución para evitar la deshidratación (solución de rehidratación oral [SRO]). Esta es chantal bebida que ayuda a reponer los líquidos y los minerales que el cuerpo perdió. Puede comprar chantal SRO en farmacias y tiendas minoristas.  Intente beber, al menos, entre 8 y 10 tazas (2,000-2,500 ml) de líquidos todos los días para ayudar a reponer los líquidos perdidos. Si lugo orina es de color amarillo pálido, está recibiendo la cantidad suficiente de líquidos.  Puede beber otros líquidos además de agua, tammy jugo de frutas con agregado de agua (jugo de fruta diluido) o bebidas deportivas de bajas calorías, según lo tolere o según las indicaciones del médico.  Evite consumir bebidas con cafeína, tammy café, té o gaseosas.  Evite evert alcohol.  Esta información no tiene tammy fin reemplazar el consejo del médico. Asegúrese de hacerle al médico cualquier pregunta que tenga.      Drink plenty of fluid   frequent handwashing   drink mixture of chamomile and peppermint tea to soothe your stomach   Place Zofran under your tongue 1 tablet 3-4 times a day as needed for nauseousness and vomiting   take famotidine half hour before breakfast and half hour before dinner for your stomach   follow-up with your medical doctor, if no improvement after 2 to 3 days, return to ED   avoid dairy, fried food, greasy food, fatty food for at least 24 hours

## 2025-06-22 NOTE — ED ADULT NURSE NOTE - NSFALLHARMRISKINTERV_ED_ALL_ED
Assistance OOB with selected safe patient handling equipment if applicable/Communicate risk of Fall with Harm to all staff, patient, and family/Provide visual cue: red socks, yellow wristband, yellow gown, etc/Reinforce activity limits and safety measures with patient and family/Bed in lowest position, wheels locked, appropriate side rails in place/Call bell, personal items and telephone in reach/Instruct patient to call for assistance before getting out of bed/chair/stretcher/Non-slip footwear applied when patient is off stretcher/[place] to call system/Physically safe environment - no spills, clutter or unnecessary equipment/Purposeful Proactive Rounding/Room/bathroom lighting operational, light cord in reach

## 2025-06-22 NOTE — ED PROVIDER NOTE - CLINICAL SUMMARY MEDICAL DECISION MAKING FREE TEXT BOX
80-year-old female complaining of vomiting x 1 and 3 episodes of watery stool today, then developed mid abdominal pain, pain relief with each diarrhea.  Concern for gastroenteritis, dehydration, electrolyte imbalance.  Will get labs, give IV fluids, GI cocktail and reassess 80-year-old female complaining of vomiting x 1 and 3 episodes of watery stool today, then developed mid abdominal pain, pain relief with each diarrhea.  Concern for gastroenteritis, dehydration, electrolyte imbalance.  Will get labs, give IV fluids, GI cocktail and reassess    2032 left result explained to patient and sister   patient with no further vomiting, will discharge home with brat diet, Zofran and famotidine   advised to follow-up with PCP

## 2025-06-26 LAB
CULTURE RESULTS: ABNORMAL
SPECIMEN SOURCE: SIGNIFICANT CHANGE UP